# Patient Record
Sex: FEMALE | Race: BLACK OR AFRICAN AMERICAN | Employment: OTHER | URBAN - METROPOLITAN AREA
[De-identification: names, ages, dates, MRNs, and addresses within clinical notes are randomized per-mention and may not be internally consistent; named-entity substitution may affect disease eponyms.]

---

## 2017-06-19 ENCOUNTER — OFFICE VISIT (OUTPATIENT)
Dept: ORTHOPEDIC SURGERY | Age: 72
End: 2017-06-19

## 2017-06-19 ENCOUNTER — HOSPITAL ENCOUNTER (OUTPATIENT)
Age: 72
Discharge: HOME | End: 2017-06-19
Attending: PHYSICAL MEDICINE & REHABILITATION
Payer: MEDICARE

## 2017-06-19 ENCOUNTER — HOSPITAL ENCOUNTER (EMERGENCY)
Age: 72
Discharge: STILL ADMITTED OR ADMINISTRATIVE | End: 2017-06-19
Attending: EMERGENCY MEDICINE
Payer: MEDICARE

## 2017-06-19 VITALS
WEIGHT: 206 LBS | HEIGHT: 67 IN | BODY MASS INDEX: 32.33 KG/M2 | DIASTOLIC BLOOD PRESSURE: 78 MMHG | HEART RATE: 72 BPM | SYSTOLIC BLOOD PRESSURE: 118 MMHG | RESPIRATION RATE: 18 BRPM

## 2017-06-19 DIAGNOSIS — M51.36 OTHER INTERVERTEBRAL DISC DEGENERATION, LUMBAR REGION: ICD-10-CM

## 2017-06-19 DIAGNOSIS — M47.816 SPONDYLOSIS WITHOUT MYELOPATHY OR RADICULOPATHY, LUMBAR REGION: Primary | ICD-10-CM

## 2017-06-19 DIAGNOSIS — M54.16 LUMBAR NEURITIS: ICD-10-CM

## 2017-06-19 DIAGNOSIS — M47.816 SPONDYLOSIS WITHOUT MYELOPATHY OR RADICULOPATHY, LUMBAR REGION: ICD-10-CM

## 2017-06-19 PROCEDURE — 72148 MRI LUMBAR SPINE W/O DYE: CPT

## 2017-06-19 PROCEDURE — 75810000275 HC EMERGENCY DEPT VISIT NO LEVEL OF CARE

## 2017-06-19 RX ORDER — OLMESARTAN MEDOXOMIL, AMLODIPINE AND HYDROCHLOROTHIAZIDE TABLET 40/10/25 MG 40; 10; 25 MG/1; MG/1; MG/1
TABLET ORAL
COMMUNITY
Start: 2017-06-18

## 2017-06-19 RX ORDER — METOPROLOL SUCCINATE 25 MG/1
TABLET, EXTENDED RELEASE ORAL
Refills: 3 | COMMUNITY
Start: 2017-05-21 | End: 2020-05-09

## 2017-06-19 RX ORDER — GABAPENTIN 100 MG/1
CAPSULE ORAL
Qty: 90 CAP | Refills: 1 | Status: SHIPPED | OUTPATIENT
Start: 2017-06-19 | End: 2017-07-26 | Stop reason: SDUPTHER

## 2017-06-19 RX ORDER — ROSUVASTATIN CALCIUM 10 MG/1
TABLET, COATED ORAL
COMMUNITY
Start: 2017-06-18

## 2017-06-19 NOTE — PROGRESS NOTES
[facility]  [address], [address]  Phone: [phone]  Fax: [phone]        INITIAL CONSULTATION      HISTORY OF PRESENT ILLNESS:  Jamal Bledsoe is a 67 y.o. female whom is referred from Dr. Diane Del Real secondary to chronic recurrent low back pain radiating into the LLE x 6 years, progressing x 1 year. No specific injury/trauma. She rates pain 10/10. Pt reports limitations with standing and walking tolerance. Symptoms consistent for stenosis. She denies h/o lumbar spinal surgery. Pt has undergone lumbar blocks in [place] several years ago with good relief. She has not attended physical therapy/chiropractor. Pt has taken Advil with slight benefit. She is not a candidate for Topamax secondary to her h/o glaucoma. The patient has a history of DM and reports blood sugars at 200. Lumbar spine XR dated 5/18/17 reviewed. Per report, scoliosis and multilevel degenerative disc disease. No acute osseous findings. There is mild curvature of the lumbar spine, apex to the right of L3. No pars defect are seen. vertebral body height is maintained. Moderate variable disc space narrowing is seen. No definite spondylolisthesis. The patient is RHD.  reviewed. Past Medical History:   Diagnosis Date    Arthritis     Diabetes ([facility] [address]     Hypertension     Renal lesion     Stroke [facility])           Past Surgical History:   Procedure Laterality Date    ABDOMEN SURGERY PROC UNLISTED      HX CHOLECYSTECTOMY           Social History   Substance Use Topics    Smoking status: Former Smoker    Smokeless tobacco: Never Used    Alcohol use 0.0 oz/week     0 Standard drinks or equivalent per week     Work status: The patient is retired. Marital status: Not available.      Current Outpatient Prescriptions   Medication Sig Dispense Refill    Olmesartan-amLODIPine-HCTZ 40-10-25 mg tab       rosuvastatin (CRESTOR) 10 mg tablet       metoprolol succinate (TOPROL-XL) 25 mg XL tablet TAKE 1 TABLET BY MOUTH ONCE A DAY. 3    gabapentin (NEURONTIN) 100 mg capsule TAKE 1 PO Q TID WITH FOOD  Indications: NEUROPATHIC PAIN 90 Cap 1    aspirin 81 mg chewable tablet Take 81 mg by mouth daily.  bimatoprost (LUMIGAN) 0.03 % ophthalmic drops Administer 1 Drop to both eyes every evening.  lisinopril (PRINIVIL, ZESTRIL) 10 mg tablet Take 10 mg by mouth daily.  amLODIPine (NORVASC) 10 mg tablet Take 10 mg by mouth daily.  pravastatin (PRAVACHOL) 40 mg tablet Take 40 mg by mouth nightly.  pantoprazole (PROTONIX) 40 mg tablet Take 40 mg by mouth daily. Allergies   Allergen Reactions    Mylanta [Aluminum-Magnesium Hydroxide] Hives            Family History   Problem Relation Age of Onset    Hypertension Mother     Hypertension Father     Hypertension Sister     Hypertension Brother          REVIEW OF SYSTEMS  Constitutional symptoms: Negative  Eyes: Negative  Ears, Nose, Throat, and Mouth: Negative  Cardiovascular: Negative  Respiratory: Negative  Genitourinary: Negative  Integumentary (Skin and/or breast): Negative  Musculoskeletal: Positive for low back pain into the LLE. Extremities: Negative for edema. Endocrine/Rheumatologic: Negative  Hematologic/Lymphatic: Negative  Allergic/Immunologic: Negative  Psychiatric: Negative       PHYSICAL EXAMINATION    Visit Vitals    /78    Pulse 72    Resp 18    Ht 5' 7\" (1.702 m)    Wt 206 lb (93.4 kg)    BMI 32.26 kg/m2       CONSTITUTIONAL: NAD, A&O x 3  HEART: Regular rate and rhythm  ABDOMEN: Positive bowel sounds, soft, nontender, and nondistended  LUNGS: Clear to auscultation bilaterally. SKIN: No rashes noted. RANGE OF MOTION: The patient has full passive range of motion in all four extremities. SENSATION: Sensation is intact to light touch throughout.   MOTOR:   Straight Leg Raise: Negative, bilateral  Sahu: Negative, bilateral  Deep tendon reflexes are 1+ at the brachioradialis, biceps, and triceps. Deep tendon reflexes are 0 at the right knee/ankle and 1+ at the left knee/ankle     Shoulder AB/Flex Elbow Flex Wrist Ext Elbow Ext Wrist Flex Hand Intrin Tone   Right +4/5 +4/5 +4/5 +4/5 +4/5 +4/5 +4/5   Left +4/5 +4/5 +4/5 +4/5 +4/5 +4/5 +4/5              Hip Flex Knee Ext Knee Flex Ankle DF GTE Ankle PF Tone   Right +4/5 +4/5 +4/5 +4/5 +4/5 +4/5 +4/5   Left +4/5 +4/5 +4/5 +4/5 +4/5 +4/5 +4/5         ASSESSMENT   Sharad Romero was seen today for back pain and leg pain. Diagnoses and all orders for this visit:    Spondylosis without myelopathy or radiculopathy, lumbar region  -     MRI LUMB SPINE WO CONT; Future  -     gabapentin (NEURONTIN) 100 mg capsule; TAKE 1 PO Q TID WITH FOOD  Indications: NEUROPATHIC PAIN    Other intervertebral disc degeneration, lumbar region  -     MRI LUMB SPINE WO CONT; Future  -     gabapentin (NEURONTIN) 100 mg capsule; TAKE 1 PO Q TID WITH FOOD  Indications: NEUROPATHIC PAIN    Lumbar neuritis  -     MRI LUMB SPINE WO CONT; Future  -     gabapentin (NEURONTIN) 100 mg capsule; TAKE 1 PO Q TID WITH FOOD  Indications: NEUROPATHIC PAIN           IMPRESSIONS/RECOMMENDATIONS:  She describes symptoms consistent for spinal stenosis. I will set her up for a lumbar spine MRI. I advised patient to bring copies of films to next visit. In the interim, I will try her on Neurontin 100 mg TID. The risks, benefits, and potential side effects of this medication were discussed. Patient understands and wishes to proceed. Patient advised to call the office if intolerant to new medication. Oral steroids deferred due to her elevated blood sugars. I will see the patient back following MRI. Written by Lisbeth Morley, as dictated by Allen Kelly MD  I examined the patient, reviewed and agree with the note.

## 2017-06-19 NOTE — MR AVS SNAPSHOT
Visit Information Date & Time Provider Department Dept. Phone Encounter #  
 6/19/2017 10:00 AM Varsha Suggs MD [facility] [place] [phone] [id number] Follow-up Instructions Return for following MRI. Upcoming Health Maintenance Date Due Hepatitis C Screening 1945 DTaP/Tdap/Td series (1 - Tdap) 1/13/1966 BREAST CANCER SCRN MAMMOGRAM 1/13/1995 FOBT Q 1 YEAR AGE 50-75 1/13/1995 ZOSTER VACCINE AGE 60> 1/13/2005 GLAUCOMA SCREENING Q2Y 1/13/2010 OSTEOPOROSIS SCREENING (DEXA) 1/13/2010 Pneumococcal 65+ Low/Medium Risk (1 of 2 - PCV13) 1/13/2010 MEDICARE YEARLY EXAM 1/13/2010 INFLUENZA AGE 9 TO ADULT 8/1/2017 Allergies as of 6/19/2017  Review Complete On: 6/19/2017 By: Varsha Suggs MD  
  
 Severity Noted Reaction Type Reactions Mylanta [Aluminum-magnesium Hydroxide]  08/11/2015    Hives Current Immunizations  Never Reviewed No immunizations on file. Not reviewed this visit You Were Diagnosed With   
  
 Codes Comments Spondylosis without myelopathy or radiculopathy, lumbar region    -  Primary ICD-10-CM: M47.816 ICD-9-CM: 721.3 Other intervertebral disc degeneration, lumbar region     ICD-10-CM: M51.36 
ICD-9-CM: 722.52 Lumbar neuritis     ICD-10-CM: M54.16 
ICD-9-CM: 724.4 Vitals BP Pulse Resp Height(growth percentile) Weight(growth percentile) BMI  
 118/78 72 18 5' 7\" (1.702 m) 206 lb (93.4 kg) 32.26 kg/m2 OB Status Smoking Status Postmenopausal Former Smoker Vitals History BMI and BSA Data Body Mass Index Body Surface Area  
 32.26 kg/m 2 2.1 m 2 Preferred Pharmacy Pharmacy Name Phone [id number] [address] [phone] Your Updated Medication List  
  
   
This list is accurate as of: 6/19/17 11:01 AM.  Always use your most recent med list.  
  
  
  
  
 amLODIPine 10 mg tablet Commonly known as:  Uriah Tyesha Take 10 mg by mouth daily. aspirin 81 mg chewable tablet Take 81 mg by mouth daily. gabapentin 100 mg capsule Commonly known as:  NEURONTIN  
TAKE 1 PO Q TID WITH FOOD  Indications: NEUROPATHIC PAIN  
  
 lisinopril 10 mg tablet Commonly known as:  Jc Dance Take 10 mg by mouth daily. LUMIGAN 0.03 % ophthalmic drops Generic drug:  bimatoprost  
Administer 1 Drop to both eyes every evening. metoprolol succinate 25 mg XL tablet Commonly known as:  TOPROL-XL  
TAKE 1 TABLET BY MOUTH ONCE A DAY. Olmesartan-amLODIPine-HCTZ 40-10-25 mg Tab  
  
 pantoprazole 40 mg tablet Commonly known as:  PROTONIX Take 40 mg by mouth daily. pravastatin 40 mg tablet Commonly known as:  PRAVACHOL Take 40 mg by mouth nightly. rosuvastatin 10 mg tablet Commonly known as:  CRESTOR Prescriptions Sent to Pharmacy Refills  
 gabapentin (NEURONTIN) 100 mg capsule 1 Sig: TAKE 1 PO Q TID WITH FOOD  Indications: NEUROPATHIC PAIN Class: Normal  
 Pharmacy: [address] [place] [address] #: [phone] Follow-up Instructions Return for following MRI. To-Do List   
 06/19/2017 Imaging:  MRI LUMB SPINE WO CONT Introducing [facility] & HEALTH SERVICES! [facility] introduces Infer patient portal. Now you can access parts of your medical record, email your doctor's office, and request medication refills online. 1. In your internet browser, go to [email or web address] 2. Click on the First Time User? Click Here link in the Sign In box. You will see the New Member Sign Up page. 3. Enter your Infer Access Code exactly as it appears below. You will not need to use this code after youve completed the sign-up process. If you do not sign up before the expiration date, you must request a new code. · [facility] Access Code: [email or web address] Expires: 9/17/2017  9:40 AM 
 
4. Enter the last four digits of your Social Security Number (xxxx) and Date of Birth (mm/dd/yyyy) as indicated and click Submit. You will be taken to the next sign-up page. 5. Create a [facility] ID. This will be your [facility] login ID and cannot be changed, so think of one that is secure and easy to remember. 6. Create a [facility] password. You can change your password at any time. 7. Enter your Password Reset Question and Answer. This can be used at a later time if you forget your password. 8. Enter your e-mail address. You will receive e-mail notification when new information is available in [address] 9. Click Sign Up. You can now view and download portions of your medical record. 10. Click the Download Summary menu link to download a portable copy of your medical information. If you have questions, please visit the Frequently Asked Questions section of the [facility] website. Remember, [facility] is NOT to be used for urgent needs. For medical emergencies, dial 911. Now available from your iPhone and Android! Please provide this summary of care documentation to your next provider. Your primary care clinician is listed as Marissa Del Real. If you have any questions after today's visit, please call [phone].

## 2017-07-26 ENCOUNTER — OFFICE VISIT (OUTPATIENT)
Dept: ORTHOPEDIC SURGERY | Age: 72
End: 2017-07-26

## 2017-07-26 VITALS
SYSTOLIC BLOOD PRESSURE: 165 MMHG | HEART RATE: 68 BPM | WEIGHT: 207 LBS | BODY MASS INDEX: 32.49 KG/M2 | DIASTOLIC BLOOD PRESSURE: 74 MMHG | HEIGHT: 67 IN

## 2017-07-26 DIAGNOSIS — N28.89 RENAL MASS, LEFT: Primary | ICD-10-CM

## 2017-07-26 DIAGNOSIS — M51.36 OTHER INTERVERTEBRAL DISC DEGENERATION, LUMBAR REGION: ICD-10-CM

## 2017-07-26 DIAGNOSIS — M54.16 LUMBAR RADICULOPATHY: ICD-10-CM

## 2017-07-26 DIAGNOSIS — G95.9 SPINAL CORD LESION (HCC): ICD-10-CM

## 2017-07-26 DIAGNOSIS — M47.816 SPONDYLOSIS WITHOUT MYELOPATHY OR RADICULOPATHY, LUMBAR REGION: ICD-10-CM

## 2017-07-26 DIAGNOSIS — R93.89 ABNORMAL MRI: ICD-10-CM

## 2017-07-26 DIAGNOSIS — M54.16 LUMBAR NEURITIS: ICD-10-CM

## 2017-07-26 RX ORDER — GABAPENTIN 100 MG/1
100 CAPSULE ORAL 3 TIMES DAILY
Qty: 270 CAP | Refills: 0 | Status: SHIPPED | OUTPATIENT
Start: 2017-07-26 | End: 2017-12-11 | Stop reason: SDUPTHER

## 2017-07-26 NOTE — MR AVS SNAPSHOT
Visit Information Date & Time Provider Department Dept. Phone Encounter #  
 7/26/2017  3:20 PM Char Barclay MD [facility] [address] [facility] - SPECIALTY [facility] [phone] [id number] Follow-up Instructions Return for following MRI and US. Upcoming Health Maintenance Date Due Hepatitis C Screening 1945 DTaP/Tdap/Td series (1 - Tdap) 1/13/1966 BREAST CANCER SCRN MAMMOGRAM 1/13/1995 FOBT Q 1 YEAR AGE 50-75 1/13/1995 ZOSTER VACCINE AGE 60> 11/13/2004 GLAUCOMA SCREENING Q2Y 1/13/2010 OSTEOPOROSIS SCREENING (DEXA) 1/13/2010 Pneumococcal 65+ Low/Medium Risk (1 of 2 - PCV13) 1/13/2010 MEDICARE YEARLY EXAM 1/13/2010 INFLUENZA AGE 9 TO ADULT 8/1/2017 Allergies as of 7/26/2017  Review Complete On: 7/26/2017 By: Char Barclay MD  
  
 Severity Noted Reaction Type Reactions Mylanta [Aluminum-magnesium Hydroxide]  08/11/2015    Hives Current Immunizations  Never Reviewed No immunizations on file. Not reviewed this visit You Were Diagnosed With   
  
 Codes Comments Abnormal MRI    -  Primary ICD-10-CM: R93.8 ICD-9-CM: 793.99 Spondylosis without myelopathy or radiculopathy, lumbar region     ICD-10-CM: M47.816 ICD-9-CM: 721.3 Other intervertebral disc degeneration, lumbar region     ICD-10-CM: M51.36 
ICD-9-CM: 722.52 Lumbar radiculopathy     ICD-10-CM: M54.16 
ICD-9-CM: 724.4 Spinal cord lesion (HCC)     ICD-10-CM: G95.9 ICD-9-CM: 336.9 Vitals BP Pulse Height(growth percentile) Weight(growth percentile) BMI OB Status 165/74 68 5' 7\" (1.702 m) 207 lb (93.9 kg) 32.42 kg/m2 Postmenopausal  
 Smoking Status Former Smoker Vitals History BMI and BSA Data Body Mass Index Body Surface Area  
 32.42 kg/m 2 2.11 m 2 Preferred Pharmacy Pharmacy Name Phone [id number] [phone] Your Updated Medication List  
  
   
This list is accurate as of: 7/26/17  4:08 PM.  Always use your most recent med list. amLODIPine 10 mg tablet Commonly known as:  Ramsey Inks Take 10 mg by mouth daily. aspirin 81 mg chewable tablet Take 81 mg by mouth daily. gabapentin 100 mg capsule Commonly known as:  NEURONTIN  
TAKE 1 PO Q TID WITH FOOD  Indications: NEUROPATHIC PAIN  
  
 lisinopril 10 mg tablet Commonly known as:  Tamara Drought Take 10 mg by mouth daily. LUMIGAN 0.03 % ophthalmic drops Generic drug:  bimatoprost  
Administer 1 Drop to both eyes every evening. metoprolol succinate 25 mg XL tablet Commonly known as:  TOPROL-XL  
TAKE 1 TABLET BY MOUTH ONCE A DAY. Olmesartan-amLODIPine-HCTZ 40-10-25 mg Tab  
  
 pantoprazole 40 mg tablet Commonly known as:  PROTONIX Take 40 mg by mouth daily. pravastatin 40 mg tablet Commonly known as:  PRAVACHOL Take 40 mg by mouth nightly. rosuvastatin 10 mg tablet Commonly known as:  CRESTOR Follow-up Instructions Return for following MRI and US. To-Do List   
 08/02/2017 Imaging:  MRI [facility] SPINE W WO CONT   
  
 08/02/2017 Imaging:  US KIDNEY LT Introducing [facility] & HEALTH SERVICES! [facility] introduces ClarityRay patient portal. Now you can access parts of your medical record, email your doctor's office, and request medication refills online. 1. In your internet browser, go to [email or web address] 2. Click on the First Time User? Click Here link in the Sign In box. You will see the New Member Sign Up page. 3. Enter your [employer] Access Code exactly as it appears below. You will not need to use this code after youve completed the sign-up process. If you do not sign up before the expiration date, you must request a new code. · [employer] Access Code: [email or web address] Expires: 9/17/2017  9:40 AM 
 
 4. Enter the last four digits of your Social Security Number (xxxx) and Date of Birth (mm/dd/yyyy) as indicated and click Submit. You will be taken to the next sign-up page. 5. Create a pushd ID. This will be your pushd login ID and cannot be changed, so think of one that is secure and easy to remember. 6. Create a pushd password. You can change your password at any time. 7. Enter your Password Reset Question and Answer. This can be used at a later time if you forget your password. 8. Enter your e-mail address. You will receive e-mail notification when new information is available in [address] 9. Click Sign Up. You can now view and download portions of your medical record. 10. Click the Download Summary menu link to download a portable copy of your medical information. If you have questions, please visit the Frequently Asked Questions section of the pushd website. Remember, pushd is NOT to be used for urgent needs. For medical emergencies, dial 911. Now available from your iPhone and Android! Please provide this summary of care documentation to your next provider. Your primary care clinician is listed as Marissa Del Real. If you have any questions after today's visit, please call [phone].

## 2017-07-26 NOTE — PROGRESS NOTES
[facility]  [address], [address]  Phone: [phone]  Fax: [phone]        PROGRESS NOTE      HISTORY OF PRESENT ILLNESS:  The patient is a 67 y.o. female and was seen today for follow up of chronic recurrent low back pain radiating into the LLE x 6 years, progressing x 1 year. No specific injury/trauma. Pt reports limitations with standing and walking tolerance. Symptoms consistent for stenosis. She denies h/o lumbar spinal surgery. Pt has undergone lumbar blocks in [place] several years ago with good relief. She has not attended physical therapy/chiropractor. Pt has taken Advil with slight benefit. She is not a candidate for Topamax secondary to her h/o glaucoma. The patient has a history of DM and reports blood sugars at 200. Lumbar spine XR dated 5/18/17 reviewed. Per report, scoliosis and multilevel degenerative disc disease. No acute osseous findings. There is mild curvature of the lumbar spine, apex to the right of L3. No pars defect are seen. vertebral body height is maintained. Moderate variable disc space narrowing is seen. No definite spondylolisthesis. The patient is RHD. At his last clinical appointment, she described symptoms consistent for spinal stenosis. I set her up for a lumbar spine MRI. I tried her on Neurontin 100 mg TID. Oral steroids deferred due to her elevated blood sugars. The patient returns today with pain location and distribution remain unchanged. She rates pain 0/10, an improvement since her last visit (10/10). Pt is tolerating Neurontin 100 mg TID with good relief. Lumbar spine MRI dated 6/19/17 reviewed. Per report, multilevel mild to moderate degenerative disc and facet joint disease throughout the lumbar spine. No high-grade central canal stenosis. Moderate foraminal stenosis at right L5/S1 due to disc osteophyte. Stable posterior paraspinal lipoma with central 5 mm calcification.  Questionable 7 mm intraspinal lesion versus volume averaging seen only on sagittal images in the lower thoracic spine T10/T11 recommend dedicated MRI with and without contrast of this region with thin section axial images at this level. Metallic susceptibility artifact in the abdomen, likely from retained ingested pill cam as seen on the CT 2015. 5 mm left renal upper pole lesion, can be evaluated with ultrasound and likely cyst.  reviewed. Past Medical History:   Diagnosis Date    Arthritis     Diabetes ([address]     Hypertension     Renal lesion     Stroke [facility])         Social History     Social History    Marital status:      Spouse name: N/A    Number of children: N/A    Years of education: N/A     Occupational History    Not on file. Social History Main Topics    Smoking status: Former Smoker    Smokeless tobacco: Never Used    Alcohol use 0.0 oz/week     0 Standard drinks or equivalent per week    Drug use: No    Sexual activity: Not on file     Other Topics Concern    Not on file     Social History Narrative       Current Outpatient Prescriptions   Medication Sig Dispense Refill    gabapentin (NEURONTIN) 100 mg capsule Take 1 Cap by mouth three (3) times daily. Indications: NEUROPATHIC PAIN 270 Cap 0    Olmesartan-amLODIPine-HCTZ 40-10-25 mg tab       rosuvastatin (CRESTOR) 10 mg tablet       metoprolol succinate (TOPROL-XL) 25 mg XL tablet TAKE 1 TABLET BY MOUTH ONCE A DAY. 3    bimatoprost (LUMIGAN) 0.03 % ophthalmic drops Administer 1 Drop to both eyes every evening.  lisinopril (PRINIVIL, ZESTRIL) 10 mg tablet Take 10 mg by mouth daily.  aspirin 81 mg chewable tablet Take 81 mg by mouth daily.  amLODIPine (NORVASC) 10 mg tablet Take 10 mg by mouth daily.  pravastatin (PRAVACHOL) 40 mg tablet Take 40 mg by mouth nightly.  pantoprazole (PROTONIX) 40 mg tablet Take 40 mg by mouth daily.          Allergies   Allergen Reactions    Mylanta [Aluminum-Magnesium Hydroxide] Hives PHYSICAL EXAMINATION    Visit Vitals    /74    Pulse 68    Ht 5' 7\" (1.702 m)    Wt 207 lb (93.9 kg)    BMI 32.42 kg/m2       CONSTITUTIONAL: NAD, A&O x 3  SENSATION: Intact to light touch throughout  RANGE OF MOTION: The patient has full passive range of motion in all four extremities. MOTOR:  Straight Leg Raise: Negative, bilateral               Hip Flex Knee Ext Knee Flex Ankle DF GTE Ankle PF Tone   Right +4/5 +4/5 +4/5 +4/5 +4/5 +4/5 +4/5   Left +4/5 +4/5 +4/5 +4/5 +4/5 +4/5 +4/5       ASSESSMENT   Diagnoses and all orders for this visit:    1. Lesion of left native kidney  -     MRI [facility] SPINE W WO CONT; Future  -     US KIDNEY LT; Future  -     gabapentin (NEURONTIN) 100 mg capsule; Take 1 Cap by mouth three (3) times daily. Indications: NEUROPATHIC PAIN  -     US KIDNEY LT; Future  -     US KIDNEY LT; Future    2. Spondylosis without myelopathy or radiculopathy, lumbar region  -     MRI [facility] SPINE W WO CONT; Future  -     US KIDNEY LT; Future  -     gabapentin (NEURONTIN) 100 mg capsule; Take 1 Cap by mouth three (3) times daily. Indications: NEUROPATHIC PAIN  -     US KIDNEY LT; Future  -     US KIDNEY LT; Future    3. Other intervertebral disc degeneration, lumbar region  -     MRI [facility] SPINE W WO CONT; Future  -     US KIDNEY LT; Future  -     gabapentin (NEURONTIN) 100 mg capsule; Take 1 Cap by mouth three (3) times daily. Indications: NEUROPATHIC PAIN  -     US KIDNEY LT; Future  -     US KIDNEY LT; Future    4. Lumbar radiculopathy  -     MRI [facility] SPINE W WO CONT; Future  -     US KIDNEY LT; Future  -     gabapentin (NEURONTIN) 100 mg capsule; Take 1 Cap by mouth three (3) times daily. Indications: NEUROPATHIC PAIN  -     US KIDNEY LT; Future  -     US KIDNEY LT; Future    5. T-spine spinal lesion (HCC)  -     MRI [facility] SPINE W WO CONT; Future  -     US KIDNEY LT; Future  -     gabapentin (NEURONTIN) 100 mg capsule; Take 1 Cap by mouth three (3) times daily.  Indications: NEUROPATHIC PAIN  -     US KIDNEY LT; Future  -     US KIDNEY LT; Future    6. Abnormal MRI  -     MRI [facility] SPINE W WO CONT; Future  -     US KIDNEY LT; Future  -     gabapentin (NEURONTIN) 100 mg capsule; Take 1 Cap by mouth three (3) times daily. Indications: NEUROPATHIC PAIN  -     US KIDNEY LT; Future    7. Lumbar neuritis  -     MRI [facility] SPINE W WO CONT; Future  -     US KIDNEY LT; Future  -     gabapentin (NEURONTIN) 100 mg capsule; Take 1 Cap by mouth three (3) times daily. Indications: NEUROPATHIC PAIN  -     US KIDNEY LT; Future  -     US KIDNEY LT; Future          IMPRESSION AND PLAN:  Patient symptoms appears to have improved since starting Neurontin 300 mg TID. As recommended by radiologist, I will order thoracic spine MRI to further evaluate for questionable T10/[id number] lesion along with a US to further evaluate left renal upper pole lesion. I will see the patient back following thoracic spine MRI and US. Written by Zion Ray, as dictated by Ariana Sanz MD  I examined the patient, reviewed and agree with the note.

## 2017-08-07 ENCOUNTER — HOSPITAL ENCOUNTER (OUTPATIENT)
Dept: ULTRASOUND IMAGING | Age: 72
Discharge: HOME | End: 2017-08-07
Attending: PHYSICAL MEDICINE & REHABILITATION
Payer: MEDICARE

## 2017-08-07 ENCOUNTER — HOSPITAL ENCOUNTER (OUTPATIENT)
Age: 72
Discharge: HOME | End: 2017-08-07
Attending: PHYSICAL MEDICINE & REHABILITATION
Payer: MEDICARE

## 2017-08-07 DIAGNOSIS — G95.9 SPINAL CORD LESION (HCC): ICD-10-CM

## 2017-08-07 DIAGNOSIS — R93.89 ABNORMAL MRI: ICD-10-CM

## 2017-08-07 DIAGNOSIS — M47.816 SPONDYLOSIS WITHOUT MYELOPATHY OR RADICULOPATHY, LUMBAR REGION: ICD-10-CM

## 2017-08-07 DIAGNOSIS — M54.16 LUMBAR NEURITIS: ICD-10-CM

## 2017-08-07 DIAGNOSIS — M54.16 LUMBAR RADICULOPATHY: ICD-10-CM

## 2017-08-07 DIAGNOSIS — N28.89 RENAL MASS, LEFT: ICD-10-CM

## 2017-08-07 DIAGNOSIS — M51.36 OTHER INTERVERTEBRAL DISC DEGENERATION, LUMBAR REGION: ICD-10-CM

## 2017-08-07 PROCEDURE — 76770 US EXAM ABDO BACK WALL COMP: CPT

## 2017-08-07 PROCEDURE — 74011250636 HC RX REV CODE- 250/636: Performed by: PHYSICAL MEDICINE & REHABILITATION

## 2017-08-07 PROCEDURE — A9585 GADOBUTROL INJECTION: HCPCS | Performed by: PHYSICAL MEDICINE & REHABILITATION

## 2017-08-07 PROCEDURE — 72157 MRI CHEST SPINE W/O & W/DYE: CPT

## 2017-08-07 RX ADMIN — GADOBUTROL 9 ML: 604.72 INJECTION INTRAVENOUS at 13:00

## 2017-08-08 ENCOUNTER — TELEPHONE (OUTPATIENT)
Dept: ORTHOPEDIC SURGERY | Age: 72
End: 2017-08-08

## 2017-08-08 NOTE — TELEPHONE ENCOUNTER
Patient is aware of the increase and how to properly increase. I advised her to call if she has any problems. Patient verbalized understanding.

## 2017-08-08 NOTE — TELEPHONE ENCOUNTER
In his last note, she was at 300 mg TID. If she is only taking 100 TID, she can titrate up to 300 mg TID.

## 2017-08-08 NOTE — TELEPHONE ENCOUNTER
Patient states that it was helping and now she is having increased pain. Pain is in the center of her back she denies injury. Please advise.

## 2017-08-08 NOTE — TELEPHONE ENCOUNTER
Patient called to request an increased dosage of gabapentin (NEURONTIN) 100 mg   Patient states she is still in pain.   Please advise patient at [phone]

## 2017-08-10 ENCOUNTER — TELEPHONE (OUTPATIENT)
Dept: ORTHOPEDIC SURGERY | Age: 72
End: 2017-08-10

## 2017-08-10 NOTE — TELEPHONE ENCOUNTER
Patient is aware that she can use her 100mg capsules to equal her 300mg. Patient verbalized understanding.

## 2017-08-10 NOTE — TELEPHONE ENCOUNTER
Patient calling checking the status of the med refill for Gabapentin. I see where Mirna Garcia spoke with her about the dosage change but not where it was called in. Patient says pharmacy does not have it. Please advise. Patient can be reached at once rx is called in at [phone].

## 2017-09-06 NOTE — TELEPHONE ENCOUNTER
It appears a prescription for a 90 day supply was provided to the patient during the [address]. Called [facility] and spoke with pharmacist who states the patient filled a 90 day supply of Gabapentin on 07/26, which should be enough medication to get her through October 24th. Called patient and left a voicemail on an unidentified voicemail requesting a call back and have the patient check for another bottle and advise how she is taking the medication. Awaiting patient's call.

## 2017-09-06 NOTE — TELEPHONE ENCOUNTER
Patient call states that she has ran out of her Gabapentin (NEURONTIN) 100 mg capsule as of today 09/06/17. Patient can be reached at [phone].

## 2017-09-07 RX ORDER — GABAPENTIN 300 MG/1
300 CAPSULE ORAL 3 TIMES DAILY
Qty: 90 CAP | Refills: 0 | Status: SHIPPED | OUTPATIENT
Start: 2017-09-07 | End: 2017-12-11 | Stop reason: SDUPTHER

## 2017-09-07 NOTE — TELEPHONE ENCOUNTER
Pt called: could not reach brady ricardo. Informed pt to contact her [facility] pharmacy to request her refills.

## 2017-09-07 NOTE — TELEPHONE ENCOUNTER
Patient reports she has been taking 3 of the 100 mg capsules 3 times a day as instructed by Dr. Yaritza Escobedo (300 mg TID). It appears the prescription provided to the patient during the LOV was for 100 mg caps to take TID. The patient is now out of medication. Pending order to reflect directions in last office note. IMPRESSION AND PLAN:  Patient symptoms appears to have improved since starting Neurontin 300 mg TID    Requested Prescriptions     Pending Prescriptions Disp Refills    gabapentin (NEURONTIN) 300 mg capsule 90 Cap 0     Sig: Take 1 Cap by mouth three (3) times daily.

## 2017-09-11 ENCOUNTER — OFFICE VISIT (OUTPATIENT)
Dept: ORTHOPEDIC SURGERY | Age: 72
End: 2017-09-11

## 2017-09-11 ENCOUNTER — TELEPHONE (OUTPATIENT)
Dept: ORTHOPEDIC SURGERY | Age: 72
End: 2017-09-11

## 2017-09-11 VITALS
HEART RATE: 50 BPM | WEIGHT: 212 LBS | BODY MASS INDEX: 33.27 KG/M2 | SYSTOLIC BLOOD PRESSURE: 150 MMHG | RESPIRATION RATE: 16 BRPM | HEIGHT: 67 IN | DIASTOLIC BLOOD PRESSURE: 68 MMHG

## 2017-09-11 DIAGNOSIS — M54.16 LUMBAR RADICULOPATHY: ICD-10-CM

## 2017-09-11 DIAGNOSIS — M47.816 SPONDYLOSIS WITHOUT MYELOPATHY OR RADICULOPATHY, LUMBAR REGION: Primary | ICD-10-CM

## 2017-09-11 DIAGNOSIS — M51.36 OTHER INTERVERTEBRAL DISC DEGENERATION, LUMBAR REGION: ICD-10-CM

## 2017-09-11 RX ORDER — GABAPENTIN 300 MG/1
300 CAPSULE ORAL 3 TIMES DAILY
Qty: 270 CAP | Refills: 0 | Status: SHIPPED | OUTPATIENT
Start: 2017-09-11 | End: 2017-12-11 | Stop reason: SDUPTHER

## 2017-09-11 RX ORDER — HYOSCYAMINE SULFATE 0.12 MG/1
0.12 TABLET SUBLINGUAL
COMMUNITY
Start: 2017-06-20

## 2017-09-11 RX ORDER — CYCLOSPORINE 0.5 MG/ML
EMULSION OPHTHALMIC
COMMUNITY
Start: 2017-07-03

## 2017-09-11 NOTE — MR AVS SNAPSHOT
Visit Information Date & Time Provider Department Dept. Phone Encounter #  
 9/11/2017  9:15 AM Eula Castellon MD [facility], [address] [facility] - SPECIALTY [facility] [phone] [id number] Follow-up Instructions Return for f/u with Nurse Practitioner in 3 month's. Upcoming Health Maintenance Date Due Hepatitis C Screening 1945 DTaP/Tdap/Td series (1 - Tdap) 1/13/1966 BREAST CANCER SCRN MAMMOGRAM 1/13/1995 FOBT Q 1 YEAR AGE 50-75 1/13/1995 ZOSTER VACCINE AGE 60> 11/13/2004 GLAUCOMA SCREENING Q2Y 1/13/2010 OSTEOPOROSIS SCREENING (DEXA) 1/13/2010 Pneumococcal 65+ Low/Medium Risk (1 of 2 - PCV13) 1/13/2010 MEDICARE YEARLY EXAM 1/13/2010 INFLUENZA AGE 9 TO ADULT 8/1/2017 Allergies as of 9/11/2017  Review Complete On: 9/11/2017 By: Eula Castellon MD  
  
 Severity Noted Reaction Type Reactions Mylanta [Aluminum-magnesium Hydroxide]  08/11/2015    Hives Current Immunizations  Never Reviewed No immunizations on file. Not reviewed this visit You Were Diagnosed With   
  
 Codes Comments Spondylosis without myelopathy or radiculopathy, lumbar region    -  Primary ICD-10-CM: M47.816 ICD-9-CM: 721.3 Lumbar radiculopathy     ICD-10-CM: M54.16 
ICD-9-CM: 724.4 Other intervertebral disc degeneration, lumbar region     ICD-10-CM: M51.36 
ICD-9-CM: 722.52 Vitals BP Pulse Resp Height(growth percentile) Weight(growth percentile) BMI  
 150/68 (!) 50 16 5' 7\" (1.702 m) 212 lb (96.2 kg) 33.2 kg/m2 OB Status Smoking Status Postmenopausal Former Smoker BMI and BSA Data Body Mass Index Body Surface Area  
 33.2 kg/m 2 2.13 m 2 Preferred Pharmacy Pharmacy Name Phone [id number] [address] [phone] Your Updated Medication List  
  
   
This list is accurate as of: 9/11/17 10:23 AM.  Always use your most recent med list.  
 amLODIPine 10 mg tablet Commonly known as:  Unknown [place] Take 10 mg by mouth daily. aspirin 81 mg chewable tablet Take 81 mg by mouth daily. * gabapentin 100 mg capsule Commonly known as:  NEURONTIN Take 1 Cap by mouth three (3) times daily. Indications: NEUROPATHIC PAIN  
  
 * gabapentin 300 mg capsule Commonly known as:  NEURONTIN Take 1 Cap by mouth three (3) times daily. * gabapentin 300 mg capsule Commonly known as:  NEURONTIN Take 1 Cap by mouth three (3) times daily. hyoscyamine SL 0.125 mg SL tablet Commonly known as:  LEVSIN/SL  
0.125 mg.  
  
 lisinopril 10 mg tablet Commonly known as:  [place] Take 10 mg by mouth daily. LUMIGAN 0.03 % ophthalmic drops Generic drug:  bimatoprost  
Administer 1 Drop to both eyes every evening. metoprolol succinate 25 mg XL tablet Commonly known as:  TOPROL-XL  
TAKE 1 TABLET BY MOUTH ONCE A DAY. Olmesartan-amLODIPine-HCTZ 40-10-25 mg Tab  
  
 pantoprazole 40 mg tablet Commonly known as:  PROTONIX Take 40 mg by mouth daily. pravastatin 40 mg tablet Commonly known as:  PRAVACHOL Take 40 mg by mouth nightly. RESTASIS 0.05 % ophthalmic emulsion Generic drug:  cycloSPORINE  
  
 rosuvastatin 10 mg tablet Commonly known as:  CRESTOR * Notice: This list has 3 medication(s) that are the same as other medications prescribed for you. Read the directions carefully, and ask your doctor or other care provider to review them with you. Prescriptions Sent to Pharmacy Refills  
 gabapentin (NEURONTIN) 300 mg capsule 0 Sig: Take 1 Cap by mouth three (3) times daily. Class: Normal  
 Pharmacy: [address] [place] [address] #: [phone] Route: Oral  
  
Follow-up Instructions Return for f/u with Nurse Practitioner in 3 month's. Introducing [facility] SERVICES! Delphine Bean introduces [employer] patient portal. Now you can access parts of your medical record, email your doctor's office, and request medication refills online. 1. In your internet browser, go to [email or web address] 2. Click on the First Time User? Click Here link in the Sign In box. You will see the New Member Sign Up page. 3. Enter your [employer] Access Code exactly as it appears below. You will not need to use this code after youve completed the sign-up process. If you do not sign up before the expiration date, you must request a new code. · [employer] Access Code: [email or web address] Expires: 9/17/2017  9:40 AM 
 
4. Enter the last four digits of your Social Security Number (xxxx) and Date of Birth (mm/dd/yyyy) as indicated and click Submit. You will be taken to the next sign-up page. 5. Create a [employer] ID. This will be your [employer] login ID and cannot be changed, so think of one that is secure and easy to remember. 6. Create a [employer] password. You can change your password at any time. 7. Enter your Password Reset Question and Answer. This can be used at a later time if you forget your password. 8. Enter your e-mail address. You will receive e-mail notification when new information is available in [address] 9. Click Sign Up. You can now view and download portions of your medical record. 10. Click the Download Summary menu link to download a portable copy of your medical information. If you have questions, please visit the Frequently Asked Questions section of the [employer] website. Remember, [employer] is NOT to be used for urgent needs. For medical emergencies, dial 911. Now available from your iPhone and Android! Please provide this summary of care documentation to your next provider. Your primary care clinician is listed as Marissa Del Real. If you have any questions after today's visit, please call [phone].

## 2017-09-11 NOTE — PROGRESS NOTES
[facility]  [address], [address]  Phone: [phone]  Fax: [phone]        PROGRESS NOTE      HISTORY OF PRESENT ILLNESS:  The patient is a 67 y.o. female and was seen today for follow up of chronic recurrent low back pain radiating into the LLE x 6 years, progressing x 1 year. No specific injury/trauma. Pt reports limitations with standing and walking tolerance. Symptoms consistent for stenosis. She denies h/o lumbar spinal surgery. Pt has undergone lumbar blocks in [place] several years ago with good relief. She has not attended physical therapy/chiropractor. Pt has taken Advil with slight benefit. She is not a candidate for Topamax secondary to her h/o glaucoma. The patient has a history of DM and reports blood sugars at 200. Lumbar spine XR dated 5/18/17 reviewed. Per report, scoliosis and multilevel degenerative disc disease. No acute osseous findings. There is mild curvature of the lumbar spine, apex to the right of L3. No pars defect are seen. vertebral body height is maintained. Moderate variable disc space narrowing is seen. No definite spondylolisthesis. Lumbar spine MRI dated 6/19/17 reviewed. Per report, multilevel mild to moderate degenerative disc and facet joint disease throughout the lumbar spine. No high-grade central canal stenosis. Moderate foraminal stenosis at right L5/S1 due to disc osteophyte. Stable posterior paraspinal lipoma with central 5 mm calcification. Questionable 7 mm intraspinal lesion versus volume averaging seen only on sagittal images in the lower thoracic spine T10/T11 recommend dedicated MRI with and without contrast of this region with thin section axial images at this level. Metallic susceptibility artifact in the abdomen, likely from retained ingested pill cam as seen on the CT 2015. 5 mm left renal upper pole lesion, can be evaluated with ultrasound and likely cyst. The patient is RHD.  At his last clinical appointment, patient symptoms appeared to have improved since starting Neurontin 300 mg TID. As recommended by radiologist, I ordered thoracic spine MRI to further evaluate for questionable T10/[id number] lesion along with a US to further evaluate left renal upper pole lesion. The patient returns today with pain location and distribution remain unchanged. She continues to rate pain 0/10. Pt reports the office hypertensive today (150/68) and has been having headaches x 3 weeks. My nurse did call her PCP's office to notify them. She is tolerating Neurontin 300 mg TID with good relief. She notes she ran out of Neurontin x 3 days with a significant increase in pain. Thoracic spine MRI dated 8/7/17 reviewed. Per report, Corresponding with that identified on lumbar spine MRI, there is local degenerative change at T10-11 including facet arthropathy which contributes to  contour deformity of the cord. The appearance on the prior lumbar spine MRI primarily corresponds with those findings. There is a small area of vascularity along thecal sac and possibly within the left side of the cord that could reflect a small telangiectasia. This could reflect a small area of vascular blush secondary to the local facet arthropathy. There is no concerning mass lesion. Patent canal and foramina. US retroperitoneum dated 8/7/17 per report revealed small lesion at the upper pole of the left kidney on the MRI is not well seen sonographically. May be limited by its small size and presence of bowel gas. This remains completely assessed (?).  reviewed. Past Medical History:   Diagnosis Date    Arthritis     Diabetes ([facility] [address]     Hypertension     Renal lesion     Stroke [facility])         Social History     Social History    Marital status:      Spouse name: N/A    Number of children: N/A    Years of education: N/A     Occupational History    Not on file.      Social History Main Topics    Smoking status: Former Smoker  Smokeless tobacco: Never Used    Alcohol use 0.0 oz/week     0 Standard drinks or equivalent per week    Drug use: No    Sexual activity: Not on file     Other Topics Concern    Not on file     Social History Narrative       Current Outpatient Prescriptions   Medication Sig Dispense Refill    hyoscyamine SL (LEVSIN/SL) 0.125 mg SL tablet 0.125 mg.      cycloSPORINE (RESTASIS) 0.05 % ophthalmic emulsion       gabapentin (NEURONTIN) 300 mg capsule Take 1 Cap by mouth three (3) times daily. 270 Cap 0    gabapentin (NEURONTIN) 300 mg capsule Take 1 Cap by mouth three (3) times daily. 90 Cap 0    Olmesartan-amLODIPine-HCTZ 40-10-25 mg tab       rosuvastatin (CRESTOR) 10 mg tablet       metoprolol succinate (TOPROL-XL) 25 mg XL tablet TAKE 1 TABLET BY MOUTH ONCE A DAY. 3    aspirin 81 mg chewable tablet Take 81 mg by mouth daily.  bimatoprost (LUMIGAN) 0.03 % ophthalmic drops Administer 1 Drop to both eyes every evening.  gabapentin (NEURONTIN) 100 mg capsule Take 1 Cap by mouth three (3) times daily. Indications: NEUROPATHIC PAIN (Patient not taking: Reported on 9/11/2017) 270 Cap 0    lisinopril (PRINIVIL, ZESTRIL) 10 mg tablet Take 10 mg by mouth daily.  amLODIPine (NORVASC) 10 mg tablet Take 10 mg by mouth daily.  pravastatin (PRAVACHOL) 40 mg tablet Take 40 mg by mouth nightly.  pantoprazole (PROTONIX) 40 mg tablet Take 40 mg by mouth daily. Allergies   Allergen Reactions    Mylanta [Aluminum-Magnesium Hydroxide] Hives          PHYSICAL EXAMINATION    Visit Vitals    /68    Pulse (!) 50    Resp 16    Ht 5' 7\" (1.702 m)    Wt 212 lb (96.2 kg)    BMI 33.2 kg/m2       CONSTITUTIONAL: NAD, A&O x 3  SENSATION: Intact to light touch throughout  RANGE OF MOTION: The patient has full passive range of motion in all four extremities.   MOTOR:  Straight Leg Raise: Negative, bilateral               Hip Flex Knee Ext Knee Flex Ankle DF GTE Ankle PF Tone   Right +4/5 +4/5 +4/5 +4/5 +4/5 +4/5 +4/5   Left +4/5 +4/5 +4/5 +4/5 +4/5 +4/5 +4/5       ASSESSMENT   Diagnoses and all orders for this visit:    1. Spondylosis without myelopathy or radiculopathy, lumbar region    2. Lumbar radiculopathy    3. Other intervertebral disc degeneration, lumbar region    Other orders  -     gabapentin (NEURONTIN) 300 mg capsule; Take 1 Cap by mouth three (3) times daily. IMPRESSION AND PLAN:  Patient wished to continue her current treatment. She was provided with refills of her Neurontin 300 mg TID. Patient will f/u with NP in 3 month's time. Written by Cherie Boyer, as dictated by [place], MD  I examined the patient, reviewed and agree with the note.

## 2017-09-11 NOTE — PROGRESS NOTES
Called Dr. Annalise Del Real office and spoke to [address] and informed her that the patient was in our office and her blood pressure was 150/68 and patient states she has taken both blood pressure medications and she has had a slight headache for the past 3 weeks. No dizziness. [address] states Dr. Seble García is in clinic and she will pass the message along and they will contact the patient for follow up because she is not sure if she would like to see the patient or put her down as a nurse visit. Patient has been notified and the provider is aware.

## 2017-10-12 ENCOUNTER — TELEPHONE (OUTPATIENT)
Dept: ORTHOPEDIC SURGERY | Age: 72
End: 2017-10-12

## 2017-10-12 NOTE — TELEPHONE ENCOUNTER
Dr. Tommy Regan prescribed the medication during the LOV for a 90 day supply. Patient instructed to contact the pharmacy for this medication.

## 2017-10-12 NOTE — TELEPHONE ENCOUNTER
PATIENT CALLED FOR REFILL ON GABAPENTIN MEDICATION FROM DR. CHANDLER. [facility] TEL. [phone]. PATIENT TEL. [phone].

## 2017-12-11 ENCOUNTER — OFFICE VISIT (OUTPATIENT)
Dept: ORTHOPEDIC SURGERY | Age: 72
End: 2017-12-11

## 2017-12-11 VITALS
BODY MASS INDEX: 32.33 KG/M2 | WEIGHT: 206 LBS | HEART RATE: 65 BPM | SYSTOLIC BLOOD PRESSURE: 173 MMHG | DIASTOLIC BLOOD PRESSURE: 72 MMHG | HEIGHT: 67 IN

## 2017-12-11 DIAGNOSIS — M54.16 LUMBAR RADICULOPATHY: Primary | ICD-10-CM

## 2017-12-11 DIAGNOSIS — M51.36 OTHER INTERVERTEBRAL DISC DEGENERATION, LUMBAR REGION: ICD-10-CM

## 2017-12-11 RX ORDER — GABAPENTIN 300 MG/1
CAPSULE ORAL
Qty: 360 CAP | Refills: 0 | Status: SHIPPED | OUTPATIENT
Start: 2017-12-11 | End: 2018-04-23 | Stop reason: ALTCHOICE

## 2017-12-11 NOTE — MR AVS SNAPSHOT
[address] [facility] 
 
 
 [address] [facility] 
[phone] Patient: Isaac Ndiaye MRN: [id number] Visit Information Date & Time Provider Department Dept. Phone Encounter #  
 12/11/2017  9:30 AM Dannielle Lutz NP [facility] [place] [phone] Follow-up Instructions Return in about 3 months (around 3/11/2018) for w/ FABIAN alejandro. Upcoming Health Maintenance Date Due Hepatitis C Screening 1945 DTaP/Tdap/Td series (1 - Tdap) 1/13/1966 BREAST CANCER SCRN MAMMOGRAM 1/13/1995 FOBT Q 1 YEAR AGE 50-75 1/13/1995 ZOSTER VACCINE AGE 60> 11/13/2004 GLAUCOMA SCREENING Q2Y 1/13/2010 OSTEOPOROSIS SCREENING (DEXA) 1/13/2010 Pneumococcal 65+ Low/Medium Risk (1 of 2 - PCV13) 1/13/2010 MEDICARE YEARLY EXAM 1/13/2010 Influenza Age 5 to Adult 8/1/2017 Allergies as of 12/11/2017  Review Complete On: 12/11/2017 By: Abhinav Bonilla Severity Noted Reaction Type Reactions Mylanta [Aluminum-magnesium Hydroxide]  08/11/2015    Hives Current Immunizations  Never Reviewed No immunizations on file. Not reviewed this visit You Were Diagnosed With   
  
 Codes Comments Lumbar radiculopathy    -  Primary ICD-10-CM: M54.16 
ICD-9-CM: 724.4 Other intervertebral disc degeneration, lumbar region     ICD-10-CM: M51.36 
ICD-9-CM: 722.52 Vitals BP Pulse Height(growth percentile) Weight(growth percentile) BMI OB Status 173/72 65 5' 7\" (1.702 m) 206 lb (93.4 kg) 32.26 kg/m2 Postmenopausal  
 Smoking Status Former Smoker Vitals History BMI and BSA Data Body Mass Index Body Surface Area  
 32.26 kg/m 2 2.1 m 2 Preferred Pharmacy Pharmacy Name Phone [phone] [address] [phone] Your Updated Medication List  
  
   
 This list is accurate as of: 12/11/17  9:52 AM.  Always use your most recent med list. amLODIPine 10 mg tablet Commonly known as:  Eltawanna Cisneroser Take 10 mg by mouth daily. aspirin 81 mg chewable tablet Take 81 mg by mouth daily. gabapentin 300 mg capsule Commonly known as:  NEURONTIN Take 2 tabs every morning, 1 tab in the afternoon and 1 tab in the evening.  
  
 hyoscyamine SL 0.125 mg SL tablet Commonly known as:  LEVSIN/SL  
0.125 mg.  
  
 lisinopril 10 mg tablet Commonly known as:  Robertha Roup Take 10 mg by mouth daily. LUMIGAN 0.03 % ophthalmic drops Generic drug:  bimatoprost  
Administer 1 Drop to both eyes every evening. metoprolol succinate 25 mg XL tablet Commonly known as:  TOPROL-XL  
TAKE 1 TABLET BY MOUTH ONCE A DAY. Olmesartan-amLODIPine-HCTZ 40-10-25 mg Tab  
  
 pantoprazole 40 mg tablet Commonly known as:  PROTONIX Take 40 mg by mouth daily. pravastatin 40 mg tablet Commonly known as:  PRAVACHOL Take 40 mg by mouth nightly. RESTASIS 0.05 % ophthalmic emulsion Generic drug:  cycloSPORINE  
  
 rosuvastatin 10 mg tablet Commonly known as:  CRESTOR Prescriptions Sent to Pharmacy Refills  
 gabapentin (NEURONTIN) 300 mg capsule 0 Sig: Take 2 tabs every morning, 1 tab in the afternoon and 1 tab in the evening. Class: Normal  
 Pharmacy: [address] [place] [address] #: [phone] Follow-up Instructions Return in about 3 months (around 3/11/2018) for w/ FABIAN alejandro. Patient Instructions Back Stretches: Exercises Your Care Instructions Here are some examples of exercises for stretching your back. Start each exercise slowly. Ease off the exercise if you start to have pain. Your doctor or physical therapist will tell you when you can start these exercises and which ones will work best for you. How to do the exercises Overhead stretch 1. Stand comfortably with your feet shoulder-width apart. 2. Looking straight ahead, raise both arms over your head and reach toward the ceiling. Do not allow your head to tilt back. 3. Hold for 15 to 30 seconds, then lower your arms to your sides. 4. Repeat 2 to 4 times. Side stretch 1. Stand comfortably with your feet shoulder-width apart. 2. Raise one arm over your head, and then lean to the other side. 3. Slide your hand down your leg as you let the weight of your arm gently stretch your side muscles. Hold for 15 to 30 seconds. 4. Repeat 2 to 4 times on each side. Press-up 1. Lie on your stomach, supporting your body with your forearms. 2. Press your elbows down into the floor to raise your upper back. As you do this, relax your stomach muscles and allow your back to arch without using your back muscles. As your press up, do not let your hips or pelvis come off the floor. 3. Hold for 15 to 30 seconds, then relax. 4. Repeat 2 to 4 times. Relax and rest 
 
1. Lie on your back with a rolled towel under your neck and a pillow under your knees. Extend your arms comfortably to your sides. 2. Relax and breathe normally. 3. Remain in this position for about 10 minutes. 4. If you can, do this 2 or 3 times each day. Follow-up care is a key part of your treatment and safety. Be sure to make and go to all appointments, and call your doctor if you are having problems. It's also a good idea to know your test results and keep a list of the medicines you take. Where can you learn more? Go to http://corinne-mynor.info/. Enter Y643 in the search box to learn more about \"Back Stretches: Exercises. \" Current as of: March 21, 2017 Content Version: 11.4 © 4476-2570 Healthwise, Incorporated. Care instructions adapted under license by [employer] (which disclaims liability or warranty for this information).  If you have questions about a medical condition or this instruction, always ask your healthcare professional. [address] [address] any warranty or liability for your use of this information. Acute Low Back Pain: Exercises Your Care Instructions Here are some examples of typical rehabilitation exercises for your condition. Start each exercise slowly. Ease off the exercise if you start to have pain. Your doctor or physical therapist will tell you when you can start these exercises and which ones will work best for you. When you are not being active, find a comfortable position for rest. Some people are comfortable on the floor or a medium-firm bed with a small pillow under their head and another under their knees. Some people prefer to lie on their side with a pillow between their knees. Don't stay in one position for too long. Take short walks (10 to 20 minutes) every 2 to 3 hours. Avoid slopes, hills, and stairs until you feel better. Walk only distances you can manage without pain, especially leg pain. How to do the exercises Back stretches 5. Get down on your hands and knees on the floor. 6. Relax your head and allow it to droop. Round your back up toward the ceiling until you feel a nice stretch in your upper, middle, and lower back. Hold this stretch for as long as it feels comfortable, or about 15 to 30 seconds. 7. Return to the starting position with a flat back while you are on your hands and knees. 8. Let your back sway by pressing your stomach toward the floor. Lift your buttocks toward the ceiling. 9. Hold this position for 15 to 30 seconds. 10. Repeat 2 to 4 times. Follow-up care is a key part of your treatment and safety. Be sure to make and go to all appointments, and call your doctor if you are having problems. It's also a good idea to know your test results and keep a list of the medicines you take. Where can you learn more? Go to http://corinne-mynor.info/. Enter S519 in the search box to learn more about \"Acute Low Back Pain: Exercises. \" Current as of: March 21, 2017 Content Version: 11.4 © 1257-2298 [employer] ([employer]). Care instructions adapted under license by [employer] (which disclaims liability or warranty for this information). If you have questions about a medical condition or this instruction, always ask your healthcare professional. [address] any warranty or liability for your use of this information. Low Back Pain: Exercises Your Care Instructions Here are some examples of typical rehabilitation exercises for your condition. Start each exercise slowly. Ease off the exercise if you start to have pain. Your doctor or physical therapist will tell you when you can start these exercises and which ones will work best for you. How to do the exercises Press-up 11. Lie on your stomach, supporting your body with your forearms. 12. Press your elbows down into the floor to raise your upper back. As you do this, relax your stomach muscles and allow your back to arch without using your back muscles. As your press up, do not let your hips or pelvis come off the floor. 13. Hold for 15 to 30 seconds, then relax. 14. Repeat 2 to 4 times. Alternate arm and leg (bird dog) exercise Do this exercise slowly. Try to keep your body straight at all times, and do not let one hip drop lower than the other. 5. Start on the floor, on your hands and knees. 6. Tighten your belly muscles. 7. Raise one leg off the floor, and hold it straight out behind you. Be careful not to let your hip drop down, because that will twist your trunk. 8. Hold for about 6 seconds, then lower your leg and switch to the other leg. 9. Repeat 8 to 12 times on each leg. 10. Over time, work up to holding for 10 to 30 seconds each time.  
11. If you feel stable and secure with your leg raised, try raising the opposite arm straight out in front of you at the same time. Knee-to-chest exercise 5. Lie on your back with your knees bent and your feet flat on the floor. 6. Bring one knee to your chest, keeping the other foot flat on the floor (or keeping the other leg straight, whichever feels better on your lower back). 7. Keep your lower back pressed to the floor. Hold for at least 15 to 30 seconds. 8. Relax, and lower the knee to the starting position. 9. Repeat with the other leg. Repeat 2 to 4 times with each leg. 10. To get more stretch, put your other leg flat on the floor while pulling your knee to your chest. 
Curl-ups 5. Lie on the floor on your back with your knees bent at a 90-degree angle. Your feet should be flat on the floor, about 12 inches from your buttocks. 6. Cross your arms over your chest. If this bothers your neck, try putting your hands behind your neck (not your head), with your elbows spread apart. 7. Slowly tighten your belly muscles and raise your shoulder blades off the floor. 8. Keep your head in line with your body, and do not press your chin to your chest. 
9. Hold this position for 1 or 2 seconds, then slowly lower yourself back down to the floor. 10. Repeat 8 to 12 times. Pelvic tilt exercise 1. Lie on your back with your knees bent. 2. \"Brace\" your stomach. This means to tighten your muscles by pulling in and imagining your belly button moving toward your spine. You should feel like your back is pressing to the floor and your hips and pelvis are rocking back. 3. Hold for about 6 seconds while you breathe smoothly. 4. Repeat 8 to 12 times. Heel dig bridging 1. Lie on your back with both knees bent and your ankles bent so that only your heels are digging into the floor. Your knees should be bent about 90 degrees.  
2. Then push your heels into the floor, squeeze your buttocks, and lift your hips off the floor until your shoulders, hips, and knees are all in a straight line. 3. Hold for about 6 seconds as you continue to breathe normally, and then slowly lower your hips back down to the floor and rest for up to 10 seconds. 4. Do 8 to 12 repetitions. Hamstring stretch in doorway 1. Lie on your back in a doorway, with one leg through the open door. 2. Slide your leg up the wall to straighten your knee. You should feel a gentle stretch down the back of your leg. 3. Hold the stretch for at least 15 to 30 seconds. Do not arch your back, point your toes, or bend either knee. Keep one heel touching the floor and the other heel touching the wall. 4. Repeat with your other leg. 5. Do 2 to 4 times for each leg. Hip flexor stretch 1. Kneel on the floor with one knee bent and one leg behind you. Place your forward knee over your foot. Keep your other knee touching the floor. 2. Slowly push your hips forward until you feel a stretch in the upper thigh of your rear leg. 3. Hold the stretch for at least 15 to 30 seconds. Repeat with your other leg. 4. Do 2 to 4 times on each side. Wall sit 1. Stand with your back 10 to 12 inches away from a wall. 2. Lean into the wall until your back is flat against it. 3. Slowly slide down until your knees are slightly bent, pressing your lower back into the wall. 4. Hold for about 6 seconds, then slide back up the wall. 5. Repeat 8 to 12 times. Follow-up care is a key part of your treatment and safety. Be sure to make and go to all appointments, and call your doctor if you are having problems. It's also a good idea to know your test results and keep a list of the medicines you take. Where can you learn more? Go to http://corinne-mynor.info/. Enter S456 in the search box to learn more about \"Low Back Pain: Exercises. \" Current as of: March 21, 2017 Content Version: 11.4 © 6570-0020 Healthwise, Incorporated.  Care instructions adapted under license by [address] (which disclaims liability or warranty for this information). If you have questions about a medical condition or this instruction, always ask your healthcare professional. [address] any warranty or liability for your use of this information. Introducing [facility]! [employer] introduces [employer] patient portal. Now you can access parts of your medical record, email your doctor's office, and request medication refills online. 1. In your internet browser, go to [email or web address] 2. Click on the First Time User? Click Here link in the Sign In box. You will see the New Member Sign Up page. 3. Enter your [employer] Access Code exactly as it appears below. You will not need to use this code after youve completed the sign-up process. If you do not sign up before the expiration date, you must request a new code. · [employer] Access Code: [email or web address] Expires: 3/8/2018  9:45 AM 
 
4. Enter the last four digits of your Social Security Number (xxxx) and Date of Birth (mm/dd/yyyy) as indicated and click Submit. You will be taken to the next sign-up page. 5. Create a We Cluster ID. This will be your We [employer] login ID and cannot be changed, so think of one that is secure and easy to remember. 6. Create a [employer] password. You can change your password at any time. 7. Enter your Password Reset Question and Answer. This can be used at a later time if you forget your password. 8. Enter your e-mail address. You will receive e-mail notification when new information is available in [address] 9. Click Sign Up. You can now view and download portions of your medical record. 10. Click the Download Summary menu link to download a portable copy of your medical information. If you have questions, please visit the Frequently Asked Questions section of the [employer] website.  Remember, [employer] is NOT to be used for urgent needs. For medical emergencies, dial 911. Now available from your iPhone and Android! Please provide this summary of care documentation to your next provider. Your primary care clinician is listed as Marissa Del Real. If you have any questions after today's visit, please call [phone].

## 2017-12-11 NOTE — PROGRESS NOTES
[address]  [address], [address]  [place]  Phone: [phone]  Fax: [phone]    Chapo Parry  : 1945  PCP: Blanka Christopher DO    PROGRESS NOTE    HISTORY OF PRESENT ILLNESS:  Chief Complaint   Patient presents with    Follow-up     lumbar LLE pain terminating at mid lower leg     Vickie Gordon is a 67 y.o.  female with history of chronic recurrent low back pain radiating into the LLE x 6 years, progressing x 1 year. No specific injury/trauma. Pt reports limitations with standing and walking tolerance. She has had blocks in the past with relief. Symptoms consistent for stenosis. She is not a candidate for Topamax secondary to her h/o glaucoma. She has a hx of DM. She currently takes Neurontin 300 mg TID. She continues to have left low back pain radiating to her left lower leg down to her calf. She has more good days then bad but does report a decreased walking tolerance due to the leg pain. Denies bladder/bowel dysfunction, saddle paresthesia, weakness, gait disturbance, or other neurological deficit. States she has been using Gabapentin with moderate relie. Pt at this time desires to  continue with current care/proceed with medication evaluation. ASSESSMENT  67 y.o. female with lumbar and left leg pain. Diagnoses and all orders for this visit:    1. Lumbar radiculopathy  -     gabapentin (NEURONTIN) 300 mg capsule; Take 2 tabs every morning, 1 tab in the afternoon and 1 tab in the evening. 2. Other intervertebral disc degeneration, lumbar region  -     gabapentin (NEURONTIN) 300 mg capsule; Take 2 tabs every morning, 1 tab in the afternoon and 1 tab in the evening. IMPRESSION/PLAN    1) Pt was given information on lumbar exercises. 2) Increase gabapentin to 600 mg every morning, 300 mg afternoon and in the evening.   3) May increase to 600 mg TID tapering up as directed if the additional morning dose is not beneficial. May call for refill  4) Ms. Arnaldo Schmidt has a reminder for a \"due or due soon\" health maintenance. I have asked that she contact her primary care provider, Tom Reynolds DO, for follow-up on this health maintenance. 5) We have informed patient to notify us for immediate appointment if he has any worsening neurogical symptoms or if an emergency situation presents, then call 911  6) Pt will follow-up in 3 months w/ NP. PAST MEDICAL HISTORY  Past Medical History:   Diagnosis Date    Arthritis     Diabetes ([address]     Hypertension     Renal lesion     Stroke [facility])         MEDICATIONS  Current Outpatient Prescriptions   Medication Sig Dispense Refill    gabapentin (NEURONTIN) 300 mg capsule Take 2 tabs every morning, 1 tab in the afternoon and 1 tab in the evening. 360 Cap 0    hyoscyamine SL (LEVSIN/SL) 0.125 mg SL tablet 0.125 mg.      cycloSPORINE (RESTASIS) 0.05 % ophthalmic emulsion       Olmesartan-amLODIPine-HCTZ 40-10-25 mg tab       metoprolol succinate (TOPROL-XL) 25 mg XL tablet TAKE 1 TABLET BY MOUTH ONCE A DAY. 3    lisinopril (PRINIVIL, ZESTRIL) 10 mg tablet Take 10 mg by mouth daily.  aspirin 81 mg chewable tablet Take 81 mg by mouth daily.  amLODIPine (NORVASC) 10 mg tablet Take 10 mg by mouth daily.  pravastatin (PRAVACHOL) 40 mg tablet Take 40 mg by mouth nightly.  bimatoprost (LUMIGAN) 0.03 % ophthalmic drops Administer 1 Drop to both eyes every evening.  rosuvastatin (CRESTOR) 10 mg tablet       pantoprazole (PROTONIX) 40 mg tablet Take 40 mg by mouth daily. ALLERGIES  Allergies   Allergen Reactions    Mylanta [Aluminum-Magnesium Hydroxide] Hives       SOCIAL HISTORY    Social History     Social History    Marital status:      Spouse name: N/A    Number of children: N/A    Years of education: N/A     Occupational History    Not on file.      Social History Main Topics    Smoking status: Former Smoker    Smokeless tobacco: Never Used    Alcohol use 0.0 oz/week     0 Standard drinks or equivalent per week    Drug use: No    Sexual activity: Not on file     Other Topics Concern    Not on file     Social History Narrative       SUBJECTIVE        Pain Scale: 0 - No pain/10    Pain Assessment  12/11/2017   Location of Pain Back;Leg   Location Modifiers Left   Severity of Pain 0   Quality of Pain Aching   Quality of Pain Comment -   Duration of Pain -   Frequency of Pain Intermittent   Aggravating Factors Standing;Walking   Aggravating Factors Comment -   Limiting Behavior Some   Relieving Factors Rest   Relieving Factors Comment -   Result of Injury No       Accompanied by family member. REVIEW OF SYSTEMS  ROS    Constitutional: Negative for fever, chills, or weight change. Respiratory: Negative for cough or shortness of breath. Cardiovascular: Negative for chest pain or palpitations. Gastrointestinal: Negative for acid reflux, change in bowel habits, or constipation. Genitourinary: Negative for incontinence, dysuria and flank pain. Musculoskeletal: Positive for low back pain. Skin: Negative for rash. Neurological: Negative for headaches, dizziness, or numbness. Endo/Heme/Allergies: Negative . Psychiatric/Behavioral: Negative. PHYSICAL EXAMINATION  Visit Vitals    /72    Pulse 65    Ht 5' 7\" (1.702 m)    Wt 206 lb (93.4 kg)    BMI 32.26 kg/m2       Constitutional: Well developed,  well nourished,  awake, alert, and in no acute distress. Neurological:  Sensation to light touch is intact. Psychiatric: Affect and mood are appropriate. Integumentary: No rashes or abrasions noted on exposed areas,  warm, dry and intact. Cardiovascular/Peripheral Vascular:  No peripheral edema is noted. Lymphatic:  No evidence of lymphedema. No cervical lymphadenopathy. SPINE/MUSCULOSKELETAL EXAM      Lumbar spine:  No rash, ecchymosis, or gross obliquity. No fasciculations. No focal atrophy is noted. Range of motion is intact. No Tenderness to palpation . SI joints non-tender. Trochanters non tender. Musculoskeletal:  No pain with extension, axial loading, or forward flexion. No pain with internal or external rotation of her hips. MOTOR     Hip Flex  Quads Hamstrings Ankle DF EHL Ankle PF   Right +4/5 +4/5 +4/5 +4/5 +4/5 +4/5   Left +4/5 +4/5 +4/5 +4/5 +4/5 +4/5       Straight Leg raise negative bilaterally. normal gait and station    Ambulation without assistive device. full weight bearing, non-antalgic gait.     Lillie Armijo, NP

## 2017-12-11 NOTE — PATIENT INSTRUCTIONS
Back Stretches: Exercises  Your Care Instructions  Here are some examples of exercises for stretching your back. Start each exercise slowly. Ease off the exercise if you start to have pain. Your doctor or physical therapist will tell you when you can start these exercises and which ones will work best for you. How to do the exercises  Overhead stretch    1. Stand comfortably with your feet shoulder-width apart. 2. Looking straight ahead, raise both arms over your head and reach toward the ceiling. Do not allow your head to tilt back. 3. Hold for 15 to 30 seconds, then lower your arms to your sides. 4. Repeat 2 to 4 times. Side stretch    1. Stand comfortably with your feet shoulder-width apart. 2. Raise one arm over your head, and then lean to the other side. 3. Slide your hand down your leg as you let the weight of your arm gently stretch your side muscles. Hold for 15 to 30 seconds. 4. Repeat 2 to 4 times on each side. Press-up    1. Lie on your stomach, supporting your body with your forearms. 2. Press your elbows down into the floor to raise your upper back. As you do this, relax your stomach muscles and allow your back to arch without using your back muscles. As your press up, do not let your hips or pelvis come off the floor. 3. Hold for 15 to 30 seconds, then relax. 4. Repeat 2 to 4 times. Relax and rest    1. Lie on your back with a rolled towel under your neck and a pillow under your knees. Extend your arms comfortably to your sides. 2. Relax and breathe normally. 3. Remain in this position for about 10 minutes. 4. If you can, do this 2 or 3 times each day. Follow-up care is a key part of your treatment and safety. Be sure to make and go to all appointments, and call your doctor if you are having problems. It's also a good idea to know your test results and keep a list of the medicines you take. Where can you learn more? Go to http://corinne-mynor.info/.   Enter D652 in the search box to learn more about \"Back Stretches: Exercises. \"  Current as of: March 21, 2017  Content Version: 11.4  © 8914-3846 [employer]. Care instructions adapted under license by [employer] (which disclaims liability or warranty for this information). If you have questions about a medical condition or this instruction, always ask your healthcare professional. [address] any warranty or liability for your use of this information. Acute Low Back Pain: Exercises  Your Care Instructions  Here are some examples of typical rehabilitation exercises for your condition. Start each exercise slowly. Ease off the exercise if you start to have pain. Your doctor or physical therapist will tell you when you can start these exercises and which ones will work best for you. When you are not being active, find a comfortable position for rest. Some people are comfortable on the floor or a medium-firm bed with a small pillow under their head and another under their knees. Some people prefer to lie on their side with a pillow between their knees. Don't stay in one position for too long. Take short walks (10 to 20 minutes) every 2 to 3 hours. Avoid slopes, hills, and stairs until you feel better. Walk only distances you can manage without pain, especially leg pain. How to do the exercises  Back stretches    5. Get down on your hands and knees on the floor. 6. Relax your head and allow it to droop. Round your back up toward the ceiling until you feel a nice stretch in your upper, middle, and lower back. Hold this stretch for as long as it feels comfortable, or about 15 to 30 seconds. 7. Return to the starting position with a flat back while you are on your hands and knees. 8. Let your back sway by pressing your stomach toward the floor. Lift your buttocks toward the ceiling. 9. Hold this position for 15 to 30 seconds. 10. Repeat 2 to 4 times.   Follow-up care is a key part of your treatment and safety. Be sure to make and go to all appointments, and call your doctor if you are having problems. It's also a good idea to know your test results and keep a list of the medicines you take. Where can you learn more? Go to http://corinne-mynor.info/. Enter N046 in the search box to learn more about \"Acute Low Back Pain: Exercises. \"  Current as of: March 21, 2017  Content Version: 11.4  © 1345-5083 [employer]. Care instructions adapted under license by [employer] (which disclaims liability or warranty for this information). If you have questions about a medical condition or this instruction, always ask your healthcare professional. [address] any warranty or liability for your use of this information. Low Back Pain: Exercises  Your Care Instructions  Here are some examples of typical rehabilitation exercises for your condition. Start each exercise slowly. Ease off the exercise if you start to have pain. Your doctor or physical therapist will tell you when you can start these exercises and which ones will work best for you. How to do the exercises  Press-up    11. Lie on your stomach, supporting your body with your forearms. 12. Press your elbows down into the floor to raise your upper back. As you do this, relax your stomach muscles and allow your back to arch without using your back muscles. As your press up, do not let your hips or pelvis come off the floor. 13. Hold for 15 to 30 seconds, then relax. 14. Repeat 2 to 4 times. Alternate arm and leg (bird dog) exercise    Do this exercise slowly. Try to keep your body straight at all times, and do not let one hip drop lower than the other. 5. Start on the floor, on your hands and knees. 6. Tighten your belly muscles. 7. Raise one leg off the floor, and hold it straight out behind you.  Be careful not to let your hip drop down, because that will twist your trunk. 8. Hold for about 6 seconds, then lower your leg and switch to the other leg. 9. Repeat 8 to 12 times on each leg. 10. Over time, work up to holding for 10 to 30 seconds each time. 11. If you feel stable and secure with your leg raised, try raising the opposite arm straight out in front of you at the same time. Knee-to-chest exercise    5. Lie on your back with your knees bent and your feet flat on the floor. 6. Bring one knee to your chest, keeping the other foot flat on the floor (or keeping the other leg straight, whichever feels better on your lower back). 7. Keep your lower back pressed to the floor. Hold for at least 15 to 30 seconds. 8. Relax, and lower the knee to the starting position. 9. Repeat with the other leg. Repeat 2 to 4 times with each leg. 10. To get more stretch, put your other leg flat on the floor while pulling your knee to your chest.  Curl-ups    5. Lie on the floor on your back with your knees bent at a 90-degree angle. Your feet should be flat on the floor, about 12 inches from your buttocks. 6. Cross your arms over your chest. If this bothers your neck, try putting your hands behind your neck (not your head), with your elbows spread apart. 7. Slowly tighten your belly muscles and raise your shoulder blades off the floor. 8. Keep your head in line with your body, and do not press your chin to your chest.  9. Hold this position for 1 or 2 seconds, then slowly lower yourself back down to the floor. 10. Repeat 8 to 12 times. Pelvic tilt exercise    1. Lie on your back with your knees bent. 2. \"Brace\" your stomach. This means to tighten your muscles by pulling in and imagining your belly button moving toward your spine. You should feel like your back is pressing to the floor and your hips and pelvis are rocking back. 3. Hold for about 6 seconds while you breathe smoothly. 4. Repeat 8 to 12 times. Heel dig bridging    1.  Lie on your back with both knees bent and your ankles bent so that only your heels are digging into the floor. Your knees should be bent about 90 degrees. 2. Then push your heels into the floor, squeeze your buttocks, and lift your hips off the floor until your shoulders, hips, and knees are all in a straight line. 3. Hold for about 6 seconds as you continue to breathe normally, and then slowly lower your hips back down to the floor and rest for up to 10 seconds. 4. Do 8 to 12 repetitions. Hamstring stretch in doorway    1. Lie on your back in a doorway, with one leg through the open door. 2. Slide your leg up the wall to straighten your knee. You should feel a gentle stretch down the back of your leg. 3. Hold the stretch for at least 15 to 30 seconds. Do not arch your back, point your toes, or bend either knee. Keep one heel touching the floor and the other heel touching the wall. 4. Repeat with your other leg. 5. Do 2 to 4 times for each leg. Hip flexor stretch    1. Kneel on the floor with one knee bent and one leg behind you. Place your forward knee over your foot. Keep your other knee touching the floor. 2. Slowly push your hips forward until you feel a stretch in the upper thigh of your rear leg. 3. Hold the stretch for at least 15 to 30 seconds. Repeat with your other leg. 4. Do 2 to 4 times on each side. Wall sit    1. Stand with your back 10 to 12 inches away from a wall. 2. Lean into the wall until your back is flat against it. 3. Slowly slide down until your knees are slightly bent, pressing your lower back into the wall. 4. Hold for about 6 seconds, then slide back up the wall. 5. Repeat 8 to 12 times. Follow-up care is a key part of your treatment and safety. Be sure to make and go to all appointments, and call your doctor if you are having problems. It's also a good idea to know your test results and keep a list of the medicines you take. Where can you learn more? Go to http://corinne-mynor.info/.   Enter A169 in the search box to learn more about \"Low Back Pain: Exercises. \"  Current as of: March 21, 2017  Content Version: 11.4  © 0429-7281 [employer]. Care instructions adapted under license by [employer] (which disclaims liability or warranty for this information). If you have questions about a medical condition or this instruction, always ask your healthcare professional. [place] [address] any warranty or liability for your use of this information.

## 2018-03-13 ENCOUNTER — HOSPITAL ENCOUNTER (EMERGENCY)
Age: 73
Discharge: HOME | End: 2018-03-13
Attending: EMERGENCY MEDICINE
Payer: MEDICARE

## 2018-03-13 ENCOUNTER — APPOINTMENT (OUTPATIENT)
Dept: GENERAL RADIOLOGY | Age: 73
End: 2018-03-13
Attending: PHYSICIAN ASSISTANT
Payer: MEDICARE

## 2018-03-13 VITALS
SYSTOLIC BLOOD PRESSURE: 171 MMHG | TEMPERATURE: 98.2 F | HEIGHT: 63 IN | RESPIRATION RATE: 18 BRPM | DIASTOLIC BLOOD PRESSURE: 82 MMHG | HEART RATE: 68 BPM | OXYGEN SATURATION: 94 %

## 2018-03-13 DIAGNOSIS — M79.89 LEG SWELLING: Primary | ICD-10-CM

## 2018-03-13 DIAGNOSIS — R03.0 ELEVATED BLOOD PRESSURE READING: ICD-10-CM

## 2018-03-13 LAB
ALBUMIN SERPL-MCNC: 3.5 G/DL (ref 3.4–5)
ALBUMIN/GLOB SERPL: 0.9 {RATIO} (ref 0.8–1.7)
ALP SERPL-CCNC: 107 U/L (ref 45–117)
ALT SERPL-CCNC: 22 U/L (ref 13–56)
ANION GAP SERPL CALC-SCNC: 6 MMOL/L (ref 3–18)
APPEARANCE UR: CLEAR
AST SERPL-CCNC: 20 U/L (ref 15–37)
BASOPHILS # BLD: 0 K/UL (ref 0–0.06)
BASOPHILS NFR BLD: 0 % (ref 0–2)
BILIRUB SERPL-MCNC: 0.4 MG/DL (ref 0.2–1)
BILIRUB UR QL: NEGATIVE
BNP SERPL-MCNC: 274 PG/ML (ref 0–900)
BUN SERPL-MCNC: 15 MG/DL (ref 7–18)
BUN/CREAT SERPL: 12 (ref 12–20)
CALCIUM SERPL-MCNC: 9.5 MG/DL (ref 8.5–10.1)
CHLORIDE SERPL-SCNC: 104 MMOL/L (ref 100–108)
CO2 SERPL-SCNC: 33 MMOL/L (ref 21–32)
COLOR UR: YELLOW
CREAT SERPL-MCNC: 1.25 MG/DL (ref 0.6–1.3)
D DIMER PPP FEU-MCNC: 0.39 UG/ML(FEU)
DIFFERENTIAL METHOD BLD: ABNORMAL
EOSINOPHIL # BLD: 0.2 K/UL (ref 0–0.4)
EOSINOPHIL NFR BLD: 3 % (ref 0–5)
ERYTHROCYTE [DISTWIDTH] IN BLOOD BY AUTOMATED COUNT: 15 % (ref 11.6–14.5)
GLOBULIN SER CALC-MCNC: 3.9 G/DL (ref 2–4)
GLUCOSE SERPL-MCNC: 110 MG/DL (ref 74–99)
GLUCOSE UR STRIP.AUTO-MCNC: NEGATIVE MG/DL
HCT VFR BLD AUTO: 44.8 % (ref 35–45)
HGB BLD-MCNC: 13.6 G/DL (ref 12–16)
HGB UR QL STRIP: NEGATIVE
KETONES UR QL STRIP.AUTO: NEGATIVE MG/DL
LEUKOCYTE ESTERASE UR QL STRIP.AUTO: NEGATIVE
LYMPHOCYTES # BLD: 3 K/UL (ref 0.9–3.6)
LYMPHOCYTES NFR BLD: 43 % (ref 21–52)
MCH RBC QN AUTO: 27.1 PG (ref 24–34)
MCHC RBC AUTO-ENTMCNC: 30.4 G/DL (ref 31–37)
MCV RBC AUTO: 89.4 FL (ref 74–97)
MONOCYTES # BLD: 0.8 K/UL (ref 0.05–1.2)
MONOCYTES NFR BLD: 12 % (ref 3–10)
NEUTS SEG # BLD: 2.9 K/UL (ref 1.8–8)
NEUTS SEG NFR BLD: 42 % (ref 40–73)
NITRITE UR QL STRIP.AUTO: NEGATIVE
PH UR STRIP: 5.5 [PH] (ref 5–8)
PLATELET # BLD AUTO: 268 K/UL (ref 135–420)
PMV BLD AUTO: 9.6 FL (ref 9.2–11.8)
POTASSIUM SERPL-SCNC: 3.8 MMOL/L (ref 3.5–5.5)
PROT SERPL-MCNC: 7.4 G/DL (ref 6.4–8.2)
PROT UR STRIP-MCNC: NEGATIVE MG/DL
RBC # BLD AUTO: 5.01 M/UL (ref 4.2–5.3)
SODIUM SERPL-SCNC: 143 MMOL/L (ref 136–145)
SP GR UR REFRACTOMETRY: 1.01 (ref 1–1.03)
UROBILINOGEN UR QL STRIP.AUTO: 0.2 EU/DL (ref 0.2–1)
WBC # BLD AUTO: 6.9 K/UL (ref 4.6–13.2)

## 2018-03-13 PROCEDURE — 71046 X-RAY EXAM CHEST 2 VIEWS: CPT

## 2018-03-13 PROCEDURE — 99283 EMERGENCY DEPT VISIT LOW MDM: CPT

## 2018-03-13 PROCEDURE — 80053 COMPREHEN METABOLIC PANEL: CPT | Performed by: PHYSICIAN ASSISTANT

## 2018-03-13 PROCEDURE — 83880 ASSAY OF NATRIURETIC PEPTIDE: CPT | Performed by: PHYSICIAN ASSISTANT

## 2018-03-13 PROCEDURE — 93005 ELECTROCARDIOGRAM TRACING: CPT

## 2018-03-13 PROCEDURE — 85025 COMPLETE CBC W/AUTO DIFF WBC: CPT | Performed by: PHYSICIAN ASSISTANT

## 2018-03-13 PROCEDURE — 81003 URINALYSIS AUTO W/O SCOPE: CPT | Performed by: PHYSICIAN ASSISTANT

## 2018-03-13 PROCEDURE — 85379 FIBRIN DEGRADATION QUANT: CPT | Performed by: PHYSICIAN ASSISTANT

## 2018-03-13 NOTE — ED NOTES
Yue Antunez is a 68 y.o. female that was discharged in stable condition. The patients diagnosis, condition and treatment were explained to  patient and aftercare instructions were given. The patient verbalized understanding. Patient armband removed and shredded.

## 2018-03-13 NOTE — ED PROVIDER NOTES
EMERGENCY DEPARTMENT HISTORY AND PHYSICAL EXAM    5:01 PM      Date: 3/13/2018  Patient Name: Eric Delgadillo    History of Presenting Illness     Chief Complaint   Patient presents with    Leg Swelling         History Provided By: Patient    Chief Complaint: leg swelling  Duration:  Weeks  Timing:  Constant  Location: both lower legs  Quality: Aching  Severity: Moderate  Modifying Factors: Started when she started taking her new HTN medication  Associated Symptoms: pain      Additional History (Context): Eric Delgadillo is a 68 y.o. female with diabetes, hypertension and Stroke who presents with bilateral lower leg swelling for the past month. States it started when she started taking her new HTN medication. States pain has increased over the past couple of days. Denies trauma, SOB, Fever, n/v.    PCP: Deysi Hunt, DO    Current Outpatient Prescriptions   Medication Sig Dispense Refill    gabapentin (NEURONTIN) 300 mg capsule Take 2 tabs every morning, 1 tab in the afternoon and 1 tab in the evening. 360 Cap 0    hyoscyamine SL (LEVSIN/SL) 0.125 mg SL tablet 0.125 mg.      cycloSPORINE (RESTASIS) 0.05 % ophthalmic emulsion       Olmesartan-amLODIPine-HCTZ 40-10-25 mg tab       rosuvastatin (CRESTOR) 10 mg tablet       metoprolol succinate (TOPROL-XL) 25 mg XL tablet TAKE 1 TABLET BY MOUTH ONCE A DAY. 3    aspirin 81 mg chewable tablet Take 81 mg by mouth daily.  bimatoprost (LUMIGAN) 0.03 % ophthalmic drops Administer 1 Drop to both eyes every evening.          Past History     Past Medical History:  Past Medical History:   Diagnosis Date    Arthritis     Diabetes ([address]     Hypertension     Renal lesion     Stroke [facility])        Past Surgical History:  Past Surgical History:   Procedure Laterality Date    ABDOMEN SURGERY PROC UNLISTED      HX CHOLECYSTECTOMY         Family History:  Family History   Problem Relation Age of Onset    Hypertension Mother     Hypertension Father    [facility] Hypertension Sister     Hypertension Brother        Social History:  Social History   Substance Use Topics    Smoking status: Former Smoker    Smokeless tobacco: Never Used    Alcohol use 0.0 oz/week     0 Standard drinks or equivalent per week       Allergies: Allergies   Allergen Reactions    Mylanta [Aluminum-Magnesium Hydroxide] Hives         Review of Systems     Constitutional:  Denies malaise, fever, chills. Head:  Denies injury. Face:  Denies injury or pain. ENMT:  Denies sore throat. Neck:  Denies injury or pain. Chest:  Denies injury. Cardiac:  Denies chest pain or palpitations. Respiratory:  Denies cough, wheezing, difficulty breathing, shortness of breath. GI/ABD:  Denies injury, pain, distention, nausea, vomiting, diarrhea. :  Denies injury, pain, dysuria or urgency. Back:  Denies injury or pain. Pelvis:  Denies injury or pain. Extremity/MS:  Leg swelling  Neuro:  Denies headache, LOC, dizziness, neurologic symptoms/deficits/paresthesias. Skin: Denies injury, rash, itching or skin changes. Physical Exam     Visit Vitals    /82 (BP 1 Location: Left arm, BP Patient Position: At rest)    Pulse 68    Temp 98.2 °F (36.8 °C)    Resp 18    Ht 5' 3\" (1.6 m)    SpO2 94%       CONSTITUTIONAL: Alert, in no apparent distress; well-developed; well-nourished. HEAD:  Normocephalic, atraumatic. EYES: PERRL; EOM's intact. ENTM: Nose: No rhinorrhea; Throat: mucous membranes moist. Posterior pharynx-normal.  Neck:  No JVD, supple without lymphadenopathy. RESP: Chest clear, equal breath sounds. CV: S1 and S2 WNL; No murmurs, gallops or rubs. GI: Abdomen soft and non-tender. No masses or organomegaly. UPPER EXT:  Normal inspection. LOWER EXT: Bilateral lower leg 2+ pitting edema, Right calf more tender than left, good pedal pulse, good cap refill, sensation intact, FROM. 5/5 strength  NEURO: strength 5/5 and sym, sensation intact.    SKIN: No rashes; Normal for age and stage. PSYCH:  Alert and oriented, normal affect. Diagnostic Study Results     Labs -  Recent Results (from the past 12 hour(s))   EKG, 12 LEAD, INITIAL    Collection Time: 03/13/18  4:43 PM   Result Value Ref Range    Ventricular Rate 66 BPM    Atrial Rate 66 BPM    P-R Interval 180 ms    QRS Duration 86 ms    Q-T Interval 414 ms    QTC Calculation (Bezet) 434 ms    Calculated P Axis 53 degrees    Calculated R Axis 21 degrees    Calculated T Axis 34 degrees    Diagnosis       Sinus rhythm with occasional premature ventricular complexes  Otherwise normal ECG  No previous ECGs available     METABOLIC PANEL, COMPREHENSIVE    Collection Time: 03/13/18  4:45 PM   Result Value Ref Range    Sodium 143 136 - 145 mmol/L    Potassium 3.8 3.5 - 5.5 mmol/L    Chloride 104 100 - 108 mmol/L    CO2 33 (H) 21 - 32 mmol/L    Anion gap 6 3.0 - 18 mmol/L    Glucose 110 (H) 74 - 99 mg/dL    BUN 15 7.0 - 18 MG/DL    Creatinine 1.25 0.6 - 1.3 MG/DL    BUN/Creatinine ratio 12 12 - 20      GFR est AA 51 (L) >60 ml/min/1.73m2    GFR est non-AA 42 (L) >60 ml/min/1.73m2    Calcium 9.5 8.5 - 10.1 MG/DL    Bilirubin, total 0.4 0.2 - 1.0 MG/DL    ALT (SGPT) 22 13 - 56 U/L    AST (SGOT) 20 15 - 37 U/L    Alk.  phosphatase 107 45 - 117 U/L    Protein, total 7.4 6.4 - 8.2 g/dL    Albumin 3.5 3.4 - 5.0 g/dL    Globulin 3.9 2.0 - 4.0 g/dL    A-G Ratio 0.9 0.8 - 1.7     URINALYSIS W/ RFLX MICROSCOPIC    Collection Time: 03/13/18  4:45 PM   Result Value Ref Range    Color YELLOW      Appearance CLEAR      Specific gravity 1.013 1.005 - 1.030      pH (UA) 5.5 5.0 - 8.0      Protein NEGATIVE  NEG mg/dL    Glucose NEGATIVE  NEG mg/dL    Ketone NEGATIVE  NEG mg/dL    Bilirubin NEGATIVE  NEG      Blood NEGATIVE  NEG      Urobilinogen 0.2 0.2 - 1.0 EU/dL    Nitrites NEGATIVE  NEG      Leukocyte Esterase NEGATIVE  NEG     D DIMER    Collection Time: 03/13/18  4:45 PM   Result Value Ref Range    D DIMER 0.39 <0.46 ug/ml(FEU)   CBC WITH AUTOMATED DIFF    Collection Time: 03/13/18  4:45 PM   Result Value Ref Range    WBC 6.9 4.6 - 13.2 K/uL    RBC 5.01 4.20 - 5.30 M/uL    HGB 13.6 12.0 - 16.0 g/dL    HCT 44.8 35.0 - 45.0 %    MCV 89.4 74.0 - 97.0 FL    MCH 27.1 24.0 - 34.0 PG    MCHC 30.4 (L) 31.0 - 37.0 g/dL    RDW 15.0 (H) 11.6 - 14.5 %    PLATELET 591 808 - 011 K/uL    MPV 9.6 9.2 - 11.8 FL    NEUTROPHILS 42 40 - 73 %    LYMPHOCYTES 43 21 - 52 %    MONOCYTES 12 (H) 3 - 10 %    EOSINOPHILS 3 0 - 5 %    BASOPHILS 0 0 - 2 %    ABS. NEUTROPHILS 2.9 1.8 - 8.0 K/UL    ABS. LYMPHOCYTES 3.0 0.9 - 3.6 K/UL    ABS. MONOCYTES 0.8 0.05 - 1.2 K/UL    ABS. EOSINOPHILS 0.2 0.0 - 0.4 K/UL    ABS. BASOPHILS 0.0 0.0 - 0.06 K/UL    DF AUTOMATED     NT-PRO BNP    Collection Time: 03/13/18  4:45 PM   Result Value Ref Range    NT pro- 0 - 900 PG/ML       Radiologic Studies -   XR CHEST PA LAT   Final Result            Medical Decision Making   I am the first provider for this patient. I reviewed the vital signs, available nursing notes, past medical history, past surgical history, family history and social history. Vital Signs-Reviewed the patient's vital signs. Pulse Oximetry Analysis -  94 on room air (Interpretation)wnl      Records Reviewed: Nursing Notes and Old Medical Records (Time of Review: 5:01 PM)    ED Course: Progress Notes, Reevaluation, and Consults:      Provider Notes (Medical Decision Making):   DDX; DVT, CHF, lymphedema,    IMPRESSION AND MEDICAL DECISION MAKING:  Based upon the patient's presentation with noted HPI and PE, along with the work up done in the emergency department, I believe that the patient has some pitting edema. Labs and imaging are reassuring. Will have her elevate her legs at night and follow up with her PCP. PROGRESS NOTE: One or more blood pressure readings were noted elevated during the Pt's presentation in the emergency department this date.  This abnormal reading has been cited in the Pt's diagnosis, and they have been encouraged to follow up with their primary care physician, or referred to a consultant for further evaluation and treatment. Pt advised of elevated BP. Advised Pt the need for follow up for the elevated BP. Advised Pt to monitor and record BP readings. ACEP guidelines are  Initiating treatment for asymptomatic hypertension in the ED is not necessary when patients have follow-up; (2) Rapidly lowering blood pressure in asymptomatic patients in the ED is unnecessary and may be harmful in some patients; (3) When ED treatment for asymptomatic hypertension is initiated, blood pressure management should attempt to gradually lower blood pressure and should not be expected to be normalized during the initial ED visit. The patient will be discharged home. Warning signs of worsening condition were discussed and understood by the patient. Based on patient's age, coexisting illness, exam, and the results of this ED evaluation, the decision to treat as an outpatient was made. Based on the information available at time of discharge, acute pathology requiring immediate intervention was deemed relative unlikely. While it is impossible to completely exclude the possibility of underlying serious disease or worsening of condition, I feel the relative likelihood is extremely low. I discussed this uncertainty with the patient, who understood ED evaluation and treatment and felt comfortable with the outpatient treatment plan. All questions regarding care, test results, and follow up were answered. The patient is stable and appropriate to discharge. They understand that they should return to the emergency department for any new or worsening symptoms. I stressed the importance of follow up for repeat assessment and possibly further evaluation/treatment. Diagnosis     Clinical Impression:   1. Leg swelling    2.  Elevated blood pressure reading      _______________________________

## 2018-03-13 NOTE — DISCHARGE INSTRUCTIONS
Elevated Blood Pressure: Care Instructions  Your Care Instructions    Blood pressure is a measure of how hard the blood pushes against the walls of your arteries. It's normal for blood pressure to go up and down throughout the day. But if it stays up over time, you have high blood pressure. Two numbers tell you your blood pressure. The first number is the systolic pressure. It shows how hard the blood pushes when your heart is pumping. The second number is the diastolic pressure. It shows how hard the blood pushes between heartbeats, when your heart is relaxed and filling with blood. An ideal blood pressure in adults is less than 120/80 (say \"120 over 80\"). High blood pressure is 140/90 or higher. You have high blood pressure if your top number is 140 or higher or your bottom number is 90 or higher, or both. The main test for high blood pressure is simple, fast, and painless. To diagnose high blood pressure, your doctor will test your blood pressure at different times. After testing your blood pressure, your doctor may ask you to test it again when you are home. If you are diagnosed with high blood pressure, you can work with your doctor to make a long-term plan to manage it. Follow-up care is a key part of your treatment and safety. Be sure to make and go to all appointments, and call your doctor if you are having problems. It's also a good idea to know your test results and keep a list of the medicines you take. How can you care for yourself at home? · Do not smoke. Smoking increases your risk for heart attack and stroke. If you need help quitting, talk to your doctor about stop-smoking programs and medicines. These can increase your chances of quitting for good. · Stay at a healthy weight. · Try to limit how much sodium you eat to less than 2,300 milligrams (mg) a day. Your doctor may ask you to try to eat less than 1,500 mg a day. · Be physically active.  Get at least 30 minutes of exercise on most days of the week. Walking is a good choice. You also may want to do other activities, such as running, swimming, cycling, or playing tennis or team sports. · Avoid or limit alcohol. Talk to your doctor about whether you can drink any alcohol. · Eat plenty of fruits, vegetables, and low-fat dairy products. Eat less saturated and total fats. · Learn how to check your blood pressure at home. When should you call for help? Call your doctor now or seek immediate medical care if:  ? · Your blood pressure is much higher than normal (such as 180/110 or higher). ? · You think high blood pressure is causing symptoms such as:  ¨ Severe headache. ¨ Blurry vision. ? Watch closely for changes in your health, and be sure to contact your doctor if:  ? · You do not get better as expected. Where can you learn more? Go to http://corinne-mynor.info/. Enter E477 in the search box to learn more about \"Elevated Blood Pressure: Care Instructions. \"  Current as of: September 21, 2016  Content Version: 11.4  © 0961-6944 [employer]. Care instructions adapted under license by [employer] (which disclaims liability or warranty for this information). If you have questions about a medical condition or this instruction, always ask your healthcare professional. [address] any warranty or liability for your use of this information.

## 2018-03-13 NOTE — ED TRIAGE NOTES
Patient states lower extremity swelling x 2 weeks. She states prior hx of swelling to legs but not to \"this extent\". Denies shortness of breath.

## 2018-03-14 LAB
ATRIAL RATE: 66 BPM
CALCULATED P AXIS, ECG09: 53 DEGREES
CALCULATED R AXIS, ECG10: 21 DEGREES
CALCULATED T AXIS, ECG11: 34 DEGREES
DIAGNOSIS, 93000: NORMAL
P-R INTERVAL, ECG05: 180 MS
Q-T INTERVAL, ECG07: 414 MS
QRS DURATION, ECG06: 86 MS
QTC CALCULATION (BEZET), ECG08: 434 MS
VENTRICULAR RATE, ECG03: 66 BPM

## 2018-03-26 ENCOUNTER — OFFICE VISIT (OUTPATIENT)
Dept: ORTHOPEDIC SURGERY | Age: 73
End: 2018-03-26

## 2018-03-26 ENCOUNTER — HOSPITAL ENCOUNTER (OUTPATIENT)
Dept: VASCULAR SURGERY | Age: 73
Discharge: HOME | End: 2018-03-26
Attending: NURSE PRACTITIONER
Payer: MEDICARE

## 2018-03-26 VITALS
DIASTOLIC BLOOD PRESSURE: 60 MMHG | SYSTOLIC BLOOD PRESSURE: 148 MMHG | HEIGHT: 63 IN | BODY MASS INDEX: 38.09 KG/M2 | WEIGHT: 215 LBS | HEART RATE: 62 BPM

## 2018-03-26 DIAGNOSIS — M79.89 LEG SWELLING: ICD-10-CM

## 2018-03-26 DIAGNOSIS — M51.36 OTHER INTERVERTEBRAL DISC DEGENERATION, LUMBAR REGION: ICD-10-CM

## 2018-03-26 DIAGNOSIS — M54.16 LUMBAR NEURITIS: Primary | ICD-10-CM

## 2018-03-26 PROCEDURE — 93970 EXTREMITY STUDY: CPT

## 2018-03-26 RX ORDER — DULOXETIN HYDROCHLORIDE 30 MG/1
30 CAPSULE, DELAYED RELEASE ORAL DAILY
Qty: 30 CAP | Refills: 1 | Status: SHIPPED | OUTPATIENT
Start: 2018-03-26 | End: 2018-04-23 | Stop reason: SDUPTHER

## 2018-03-26 RX ORDER — FUROSEMIDE 20 MG/1
20 TABLET ORAL
COMMUNITY
Start: 2018-03-22

## 2018-03-26 NOTE — PROGRESS NOTES
Yessica Pandey [facility] [address]  [address], [address]  [facility], [address]  Phone: [phone]  Fax: [phone]    Clay Sprain  : 1945  PCP: Debbie Carrasco, DO    PROGRESS NOTE    HISTORY OF PRESENT ILLNESS:  Chief Complaint   Patient presents with    Follow-up     lumbar LLE pain terminating at foot   Nga Baldwin is a 67 y.o.  female with history of chronic recurrent low back pain radiating into the LLE x 6 years, progressing x 1 year. No specific injury/trauma. Pt reports limitations with standing and walking tolerance. She has had blocks in the past with relief. Symptoms consistent for stenosis. She is not a candidate for Topamax secondary to her h/o glaucoma. She has a hx of DM. She is not any antidepressants. She currently takes Neurontin 600 mg QAM and 300 mg Q afternoon and QHS. She continues to have left low back pain radiating to her left lower leg down to her calf. She has more good days then bad but does report a decreased walking tolerance due to the leg pain. Today,  She states she is hurting but in her legs. She has had increased swelling. She has been to the ER and her PCP for this. They started on her on Lasix. She sates her legs have always swollen some, but now she can not wear shoes. She states since the increase in Gabapentin, she feels the swelling has greatly increased. She has not had a duplex study. Denies bladder/bowel dysfunction, saddle paresthesia, weakness, gait disturbance, or other neurological deficit. States she has been using Gabapentin with moderate relie. Pt at this time desires to continue with current care/proceed with medication evaluation. ASSESSMENT  68 y.o. female with lumbar pain and bilateral leg swelling. Diagnoses and all orders for this visit:    1. Lumbar neuritis  -     DULoxetine (CYMBALTA) 30 mg capsule; Take 1 Cap by mouth daily. Indications: NEUROPATHIC PAIN    2.  Other intervertebral disc degeneration, lumbar region  -     DULoxetine (CYMBALTA) 30 mg capsule; Take 1 Cap by mouth daily. Indications: NEUROPATHIC PAIN    3. Leg swelling  -     DUPLEX LOWER EXT VENOUS BILAT; Future       IMPRESSION/PLAN    1) Pt was given information on back care. 2) Trial of Cymbalta 30 mg. Taper off Gabapentin due to swelling. 3) Duplex of bilateral LE as this has not been done, calf's painful and warm. 4) Ms. Lesly Castillo has a reminder for a \"due or due soon\" health maintenance. I have asked that she contact her primary care provider, Zoë Gaitan DO, for follow-up on this health maintenance. 5) We have informed patient to notify us for immediate appointment if he has any worsening neurogical symptoms or if an emergency situation presents, then call 911  6) Pt will follow-up in back in 4 weeks. PAST MEDICAL HISTORY  Past Medical History:   Diagnosis Date    Arthritis     Diabetes ([facility] [address]     Hypertension     Renal lesion     Stroke [facility])         MEDICATIONS  Current Outpatient Prescriptions   Medication Sig Dispense Refill    SITagliptin (JANUVIA) 50 mg tablet 50 mg.      furosemide (LASIX) 20 mg tablet 20 mg.      DULoxetine (CYMBALTA) 30 mg capsule Take 1 Cap by mouth daily. Indications: NEUROPATHIC PAIN 30 Cap 1    gabapentin (NEURONTIN) 300 mg capsule Take 2 tabs every morning, 1 tab in the afternoon and 1 tab in the evening. 360 Cap 0    hyoscyamine SL (LEVSIN/SL) 0.125 mg SL tablet 0.125 mg.      cycloSPORINE (RESTASIS) 0.05 % ophthalmic emulsion       Olmesartan-amLODIPine-HCTZ 40-10-25 mg tab       rosuvastatin (CRESTOR) 10 mg tablet       metoprolol succinate (TOPROL-XL) 25 mg XL tablet TAKE 1 TABLET BY MOUTH ONCE A DAY. 3    aspirin 81 mg chewable tablet Take 81 mg by mouth daily.  bimatoprost (LUMIGAN) 0.03 % ophthalmic drops Administer 1 Drop to both eyes every evening.          ALLERGIES  Allergies   Allergen Reactions    Mylanta [Aluminum-Magnesium Hydroxide] Hives SOCIAL HISTORY    Social History     Social History    Marital status:      Spouse name: N/A    Number of children: N/A    Years of education: N/A     Occupational History    Not on file. Social History Main Topics    Smoking status: Former Smoker    Smokeless tobacco: Never Used    Alcohol use 0.0 oz/week     0 Standard drinks or equivalent per week    Drug use: No    Sexual activity: Not on file     Other Topics Concern    Not on file     Social History Narrative       SUBJECTIVE    Pain Scale: 0 - No pain/10    Pain Assessment  3/26/2018   Location of Pain Back;Leg   Location Modifiers Left   Severity of Pain 0   Quality of Pain Aching   Quality of Pain Comment -   Duration of Pain -   Frequency of Pain Intermittent   Aggravating Factors Standing;Walking   Aggravating Factors Comment -   Limiting Behavior Some   Relieving Factors Rest   Relieving Factors Comment -   Result of Injury No       Accompanied by family member. REVIEW OF SYSTEMS  ROS    Constitutional: Negative for fever, chills, or weight change. Respiratory: Negative for cough or shortness of breath. Cardiovascular: Negative for chest pain or palpitations. Gastrointestinal: Negative for acid reflux, change in bowel habits, or constipation. Genitourinary: Negative for incontinence, dysuria and flank pain. Musculoskeletal: Positive for lumbar and bilateral leg pain. Skin: Negative for rash. Neurological: Negative for headaches, dizziness, or numbness. Endo/Heme/Allergies: Negative . Psychiatric/Behavioral: Negative. PHYSICAL EXAMINATION  Visit Vitals    /60    Pulse 62    Ht 5' 3\" (1.6 m)    Wt 215 lb (97.5 kg)    BMI 38.09 kg/m2       Constitutional: Well developed,  well nourished,  awake, alert, and in no acute distress. Neurological:  Sensation to light touch is intact. Psychiatric: Affect and mood are appropriate.    Integumentary: No rashes or abrasions noted on exposed areas,  warm, dry and intact. Cardiovascular/Peripheral Vascular:  No peripheral edema is noted. Lymphatic:  No evidence of lymphedema. No cervical lymphadenopathy. SPINE/MUSCULOSKELETAL EXAM    Lumbar spine:  No rash, ecchymosis, or gross obliquity. No fasciculations. No focal atrophy is noted. Range of motion is intact. No Tenderness to palpation to lumbar. SI joints non-tender. Trochanters non tender. Musculoskeletal:  No pain with extension, axial loading, or forward flexion. No pain with internal or external rotation of her hips. MOTOR     Hip Flex  Quads Hamstrings Ankle DF EHL Ankle PF   Right +4/5 +4/5 +4/5 +4/5 +4/5 +4/5   Left +4/5 +4/5 +4/5 +4/5 +4/5 +4/5   Straight Leg raise negative bilaterally. normal gait and station    Ambulation without assistive device. full weight bearing, non-antalgic gait.     Winda Factor, NP

## 2018-03-26 NOTE — PROCEDURES
[facility]  *** FINAL REPORT ***    Name: Hadley Fox  MRN: [id number]    Outpatient  : 1945  HIS Order #: [id number]  [address] [facility] Visit #: [id number]  Date: 26 Mar 2018    TYPE OF TEST: Peripheral Venous Testing    REASON FOR TEST  Pain in limb, Limb swelling    Right Leg:-  Deep venous thrombosis:           No  Superficial venous thrombosis:    No  Deep venous insufficiency:        Not examined  Superficial venous insufficiency: Not examined    Left Leg:-  Deep venous thrombosis:           No  Superficial venous thrombosis:    No  Deep venous insufficiency:        Not examined  Superficial venous insufficiency: Not examined      INTERPRETATION/FINDINGS  Duplex images were obtained using 2-D gray scale, color flow, and  spectral Doppler analysis. Right leg :  1. No evidence of deep venous thrombosis detected in the veins  visualized. 2. Deep vein(s) visualized include the common femoral, femoral,  popliteal, posterior tibial, and peroneal veins. 3. No evidence of superficial thrombosis detected. 4. Superficial vein(s) visualized include the great saphenous vein at  the sapheno-femoral junction. Left leg :  1. No evidence of deep venous thrombosis detected in the veins  visualized. 2. Deep vein(s) visualized include the common femoral, femoral,  popliteal, posterior tibial, and peroneal veins. 3. No evidence of superficial thrombosis detected. 4. Superficial vein(s) visualized include the great saphenous vein at  the sapheno-femoral junction. ADDITIONAL COMMENTS    I have personally reviewed the data relevant to the interpretation of  this  study. TECHNOLOGIST: Baljeet Lentz RVT  Signed: 2018 02:49 PM    PHYSICIAN: Jeannine Alonso MD  Signed: 2018 08:50 AM

## 2018-03-26 NOTE — PROGRESS NOTES
Bilateral lower extremity venous duplex exam complete; preliminary findings to follow. Armband removed and patient discharged.

## 2018-03-28 ENCOUNTER — TELEPHONE (OUTPATIENT)
Dept: ORTHOPEDIC SURGERY | Age: 73
End: 2018-03-28

## 2018-04-23 ENCOUNTER — OFFICE VISIT (OUTPATIENT)
Dept: ORTHOPEDIC SURGERY | Age: 73
End: 2018-04-23

## 2018-04-23 VITALS
BODY MASS INDEX: 37.21 KG/M2 | HEART RATE: 58 BPM | HEIGHT: 63 IN | WEIGHT: 210 LBS | SYSTOLIC BLOOD PRESSURE: 168 MMHG | DIASTOLIC BLOOD PRESSURE: 75 MMHG

## 2018-04-23 DIAGNOSIS — M51.36 OTHER INTERVERTEBRAL DISC DEGENERATION, LUMBAR REGION: ICD-10-CM

## 2018-04-23 DIAGNOSIS — M54.16 LUMBAR NEURITIS: ICD-10-CM

## 2018-04-23 RX ORDER — DULOXETIN HYDROCHLORIDE 30 MG/1
30 CAPSULE, DELAYED RELEASE ORAL DAILY
Qty: 90 CAP | Refills: 0 | Status: SHIPPED | OUTPATIENT
Start: 2018-04-23 | End: 2018-07-23 | Stop reason: SDUPTHER

## 2018-04-23 NOTE — PROGRESS NOTES
Yessica Pandey [facility] [address].  [address], [address]  [place], [address] [place] [address] Street  Phone: [phone]  Fax: [phone]    Mildred Evans  : 1945  PCP: Toby Robertson DO    PROGRESS NOTE    HISTORY OF PRESENT ILLNESS:  Chief Complaint   Patient presents with    Follow-up     lumbar LLE pain. Jose Armando Hollis a 67 y. o.  female with history of chronic recurrent low back pain radiating into the LLE x 6 years, progressing x 1 year. No specific injury/trauma. Pt reports limitations with standing and walking tolerance. She has had blocks in the past with relief.  Symptoms consistent for stenosis. She is not a candidate for Topamax secondary to her h/o glaucoma. She has a hx of DM. She is not any antidepressants. She currently takes Neurontin 600 mg QAM and 300 mg Q afternoon and QHS. She continues to have left low back pain radiating to her left lower leg down to her calf.  She has more good days then bad but does report a decreased walking tolerance due to the leg pain. At her last visit, she had been started on Lasix by her PCP for leg swelling. Duplex study negative for blood clots. We started her on Cymbalta 30 mg and stopped gabapentin. Today, she states she is doing great. The Cymbalta is working great. She has no pain most of the time. She will have mild pain with prolonged standing. She may have some pain after 15 mins of walking. Her back pain and leg pain are controlled on this dose. Denies bladder/bowel dysfunction, saddle paresthesia, weakness, gait disturbance, or other neurological deficit. States she has been using Gabapentin with moderate relie. Pt at this time desires to continue with current care/proceed with medication evaluation. ASSESSMENT  68 y.o. female with a history of lumbar pain. Diagnoses and all orders for this visit:    1. Lumbar neuritis  -     DULoxetine (CYMBALTA) 30 mg capsule; Take 1 Cap by mouth daily.  Indications: NEUROPATHIC PAIN    2. Other intervertebral disc degeneration, lumbar region  -     DULoxetine (CYMBALTA) 30 mg capsule; Take 1 Cap by mouth daily. Indications: NEUROPATHIC PAIN       IMPRESSION/PLAN    1) Pt was given information on back care. 2) Continue Cymbalta 30 mg.   3) Ms. Mike Joya has a reminder for a \"due or due soon\" health maintenance. I have asked that she contact her primary care provider, Abhishek Skinner DO, for follow-up on this health maintenance. 4) We have informed patient to notify us for immediate appointment if he has any worsening neurogical symptoms or if an emergency situation presents, then call 911  5) Pt will follow-up in 3 months for med fu. PAST MEDICAL HISTORY  Past Medical History:   Diagnosis Date    Arthritis     Diabetes ([facility] [address]     Hypertension     Renal lesion     Stroke [facility])         MEDICATIONS  Current Outpatient Prescriptions   Medication Sig Dispense Refill    DULoxetine (CYMBALTA) 30 mg capsule Take 1 Cap by mouth daily. Indications: NEUROPATHIC PAIN 90 Cap 0    SITagliptin (JANUVIA) 50 mg tablet 50 mg.      furosemide (LASIX) 20 mg tablet 20 mg.      hyoscyamine SL (LEVSIN/SL) 0.125 mg SL tablet 0.125 mg.      cycloSPORINE (RESTASIS) 0.05 % ophthalmic emulsion       Olmesartan-amLODIPine-HCTZ 40-10-25 mg tab       rosuvastatin (CRESTOR) 10 mg tablet       metoprolol succinate (TOPROL-XL) 25 mg XL tablet TAKE 1 TABLET BY MOUTH ONCE A DAY. 3    aspirin 81 mg chewable tablet Take 81 mg by mouth daily.  bimatoprost (LUMIGAN) 0.03 % ophthalmic drops Administer 1 Drop to both eyes every evening. ALLERGIES  Allergies   Allergen Reactions    Mylanta [Aluminum-Magnesium Hydroxide] Hives       SOCIAL HISTORY    Social History     Social History    Marital status:      Spouse name: N/A    Number of children: N/A    Years of education: N/A     Occupational History    Not on file.      Social History Main Topics    Smoking status: Former Smoker  Smokeless tobacco: Never Used    Alcohol use 0.0 oz/week     0 Standard drinks or equivalent per week    Drug use: No    Sexual activity: Not on file     Other Topics Concern    Not on file     Social History Narrative       SUBJECTIVE        Pain Scale: 0 - No pain/10    Pain Assessment  4/23/2018   Location of Pain Back;Leg   Location Modifiers Left   Severity of Pain 0   Quality of Pain -   Quality of Pain Comment -   Duration of Pain -   Frequency of Pain -   Aggravating Factors -   Aggravating Factors Comment -   Limiting Behavior -   Relieving Factors -   Relieving Factors Comment -   Result of Injury -       Accompanied by self. REVIEW OF SYSTEMS  ROS    Constitutional: Negative for fever, chills, or weight change. Respiratory: Negative for cough or shortness of breath. Cardiovascular: Negative for chest pain or palpitations. Gastrointestinal: Negative for acid reflux, change in bowel habits, or constipation. Genitourinary: Negative for incontinence, dysuria and flank pain. Musculoskeletal: Negative for lumbar pain. Skin: Negative for rash. Neurological: Negative for headaches, dizziness, or numbness. Endo/Heme/Allergies: Negative . Psychiatric/Behavioral: Negative. PHYSICAL EXAMINATION  Visit Vitals    /75    Pulse (!) 58    Ht 5' 3\" (1.6 m)    Wt 210 lb (95.3 kg)    BMI 37.2 kg/m2       Constitutional: Well developed,  well nourished,  awake, alert, and in no acute distress. Neurological:  Sensation to light touch is intact. Psychiatric: Affect and mood are appropriate. Integumentary: No rashes or abrasions noted on exposed areas,  warm, dry and intact. Cardiovascular/Peripheral Vascular:  No peripheral edema is noted. Lymphatic:  No evidence of lymphedema. No cervical lymphadenopathy. SPINE/MUSCULOSKELETAL EXAM      Lumbar spine:  No rash, ecchymosis, or gross obliquity. No fasciculations. No focal atrophy is noted. Range of motion is intact.  No Tenderness to palpation . SI joints non-tender. Trochanters non tender. Musculoskeletal:  No pain with extension, axial loading, or forward flexion. No pain with internal or external rotation of her hips. MOTOR     Hip Flex  Quads Hamstrings Ankle DF EHL Ankle PF   Right +4/5 +4/5 +4/5 +4/5 +4/5 +4/5   Left +4/5 +4/5 +4/5 +4/5 +4/5 +4/5       Straight Leg raise negative bilaterally. normal gait and station    Ambulation without assistive device. full weight bearing, non-antalgic gait.     Sonam Foy, FABIAN

## 2018-04-23 NOTE — PATIENT INSTRUCTIONS
Learning About How to Have a Healthy Back  What causes back pain? Back pain is often caused by overuse, strain, or injury. For example, people often hurt their backs playing sports or working in the yard, being jolted in a car accident, or lifting something too heavy. Aging plays a part too. Your bones and muscles tend to lose strength as you age, which makes injury more likely. The spongy discs between the bones of the spine (vertebrae) may suffer from wear and tear and no longer provide enough cushion between the bones. A disc that bulges or breaks open (herniated disc) can press on nerves, causing back pain. In some people, back pain is the result of arthritis, broken vertebrae caused by bone loss (osteoporosis), illness, or a spine problem. Although most people have back pain at one time or another, there are steps you can take to make it less likely. How can you have a healthy back? Reduce stress on your back through good posture  Slumping or slouching alone may not cause low back pain. But after the back has been strained or injured, bad posture can make pain worse. · Sleep in a position that maintains your back's normal curves and on a mattress that feels comfortable. Sleep on your side with a pillow between your knees, or sleep on your back with a pillow under your knees. These positions can reduce strain on your back. · Stand and sit up straight. \"Good posture\" generally means your ears, shoulders, and hips are in a straight line. · If you must stand for a long time, put one foot on a stool, ledge, or box. Switch feet every now and then. · Sit in a chair that is low enough to let you place both feet flat on the floor with both knees nearly level with your hips. If your chair or desk is too high, use a footrest to raise your knees. Place a small pillow, a rolled-up towel, or a lumbar roll in the curve of your back if you need extra support.   · Try a kneeling chair, which helps tilt your hips forward. This takes pressure off your lower back. · Try sitting on an exercise ball. It can rock from side to side, which helps keep your back loose. · When driving, keep your knees nearly level with your hips. Sit straight, and drive with both hands on the steering wheel. Your arms should be in a slightly bent position. Reduce stress on your back through careful lifting  · Squat down, bending at the hips and knees only. If you need to, put one knee to the floor and extend your other knee in front of you, bent at a right angle (half kneeling). · Press your chest straight forward. This helps keep your upper back straight while keeping a slight arch in your low back. · Hold the load as close to your body as possible, at the level of your belly button (navel). · Use your feet to change direction, taking small steps. · Lead with your hips as you change direction. Keep your shoulders in line with your hips as you move. · Set down your load carefully, squatting with your knees and hips only. Exercise and stretch your back  · Do some exercise on most days of the week, if your doctor says it is okay. You can walk, run, swim, or cycle. · Stretch your back muscles. Here are a few exercises to try:  [place] on your back, and gently pull one bent knee to your chest. Put that foot back on the floor, and then pull the other knee to your chest.  ¨ Do pelvic tilts. Lie on your back with your knees bent. Tighten your stomach muscles. Pull your belly button (navel) in and up toward your ribs. You should feel like your back is pressing to the floor and your hips and pelvis are slightly lifting off the floor. Hold for 6 seconds while breathing smoothly. ¨ Sit with your back flat against a wall. · Keep your core muscles strong. The muscles of your back, belly (abdomen), and buttocks support your spine. ¨ Pull in your belly and imagine pulling your navel toward your spine. Hold this for 6 seconds, then relax.  Remember to keep breathing normally as you tense your muscles. ¨ Do curl-ups. Always do them with your knees bent. Keep your low back on the floor, and curl your shoulders toward your knees using a smooth, slow motion. Keep your arms folded across your chest. If this bothers your neck, try putting your hands behind your neck (not your head), with your elbows spread apart. ¨ Lie on your back with your knees bent and your feet flat on the floor. Tighten your belly muscles, and then push with your feet and raise your buttocks up a few inches. Hold this position 6 seconds as you continue to breathe normally, then lower yourself slowly to the floor. Repeat 8 to 12 times. ¨ If you like group exercise, try Pilates or yoga. These classes have poses that strengthen the core muscles. Lead a healthy lifestyle  · Stay at a healthy weight to avoid strain on your back. · Do not smoke. Smoking increases the risk of osteoporosis, which weakens the spine. If you need help quitting, talk to your doctor about stop-smoking programs and medicines. These can increase your chances of quitting for good. Where can you learn more? Go to http://corinne-mynor.info/. Enter L315 in the search box to learn more about \"Learning About How to Have a Healthy Back. \"  Current as of: March 21, 2017  Content Version: 11.4  © 4433-7821 [employer]. Care instructions adapted under license by [employer] (which disclaims liability or warranty for this information). If you have questions about a medical condition or this instruction, always ask your healthcare professional. [place] [address] any warranty or liability for your use of this information.

## 2018-07-23 ENCOUNTER — OFFICE VISIT (OUTPATIENT)
Dept: ORTHOPEDIC SURGERY | Age: 73
End: 2018-07-23

## 2018-07-23 VITALS
SYSTOLIC BLOOD PRESSURE: 176 MMHG | RESPIRATION RATE: 16 BRPM | BODY MASS INDEX: 38.02 KG/M2 | HEART RATE: 81 BPM | DIASTOLIC BLOOD PRESSURE: 68 MMHG | OXYGEN SATURATION: 97 % | HEIGHT: 63 IN | WEIGHT: 214.6 LBS

## 2018-07-23 DIAGNOSIS — M51.36 OTHER INTERVERTEBRAL DISC DEGENERATION, LUMBAR REGION: ICD-10-CM

## 2018-07-23 DIAGNOSIS — M54.16 LUMBAR NEURITIS: ICD-10-CM

## 2018-07-23 PROBLEM — E66.01 SEVERE OBESITY (BMI 35.0-39.9): Status: ACTIVE | Noted: 2018-07-23

## 2018-07-23 RX ORDER — DULOXETIN HYDROCHLORIDE 30 MG/1
30 CAPSULE, DELAYED RELEASE ORAL DAILY
Qty: 90 CAP | Refills: 1 | Status: SHIPPED | OUTPATIENT
Start: 2018-07-23 | End: 2019-02-01 | Stop reason: SDUPTHER

## 2018-07-23 NOTE — PROGRESS NOTES
Yessica Pandey [facility] [address].  [address], [address]  [place], [address] [place] [address] Street  Phone: [phone]  Fax: [phone]    Camelia Davenport  : 1945  PCP: Caro Frias DO    PROGRESS NOTE    HISTORY OF PRESENT ILLNESS:  Chief Complaint   Patient presents with    Back Pain     F/U      Signa Sharper a 67 y. o.  female with history of chronic recurrent low back pain radiating into the LLE x 6 years, progressing x 1 year. No specific injury/trauma. Pt reports limitations with standing and walking tolerance. She has had blocks in the past with relief.  Symptoms consistent for stenosis. She is not a candidate for Topamax secondary to her h/o glaucoma. She has a hx of DM.  She is not any antidepressants.  She currently takes Neurontin 600 mg QAM and 300 mg Q afternoon and QHS. She continues to have left low back pain radiating to her left lower leg down to her calf.  She has more good days then bad but does report a decreased walking tolerance due to the leg pain. At her last visit, she had been started on Lasix by her PCP for leg swelling. Duplex study negative for blood clots. We started her on Cymbalta 30 mg and stopped gabapentin. At her last visit, she was doing great the Cymbalta was working well. She had mild pain with prolonged istting and 15 minutes of walking. Today, she is doing great. Her back feels good. She is in no pain and denies leg pain/  Denies bladder/bowel dysfunction, saddle paresthesia, weakness, gait disturbance, or other neurological deficit. Pt at this time desires to continue with current care/proceed with medication evaluation. ASSESSMENT  68 y.o. female with a hx of lumbar pain. Diagnoses and all orders for this visit:    1. Lumbar neuritis  -     DULoxetine (CYMBALTA) 30 mg capsule; Take 1 Cap by mouth daily. Indications: NEUROPATHIC PAIN    2.  Other intervertebral disc degeneration, lumbar region  -     DULoxetine (CYMBALTA) 30 mg capsule; Take 1 Cap by mouth daily. Indications: NEUROPATHIC PAIN         IMPRESSION/PLAN    1) Pt was given information on back care. 2) Continue Cymbalta 30 mg daily  3) Released from speciality care. Further refills from PCP. 4) Ms. Princess Dang has a reminder for a \"due or due soon\" health maintenance. I have asked that she contact her primary care provider, Cecy Almonte DO, for follow-up on this health maintenance. 5) We have informed patient to notify us for immediate appointment if he has any worsening neurogical symptoms or if an emergency situation presents, then call 911  6) Pt will follow-up in PRN. PAST MEDICAL HISTORY  Past Medical History:   Diagnosis Date    Arthritis     Diabetes ([facility] [address]     Hypertension     Renal lesion     Stroke [facility])         MEDICATIONS  Current Outpatient Prescriptions   Medication Sig Dispense Refill    DULoxetine (CYMBALTA) 30 mg capsule Take 1 Cap by mouth daily. Indications: NEUROPATHIC PAIN 90 Cap 1    SITagliptin (JANUVIA) 50 mg tablet 50 mg.      furosemide (LASIX) 20 mg tablet 20 mg.      hyoscyamine SL (LEVSIN/SL) 0.125 mg SL tablet 0.125 mg.      cycloSPORINE (RESTASIS) 0.05 % ophthalmic emulsion       Olmesartan-amLODIPine-HCTZ 40-10-25 mg tab       rosuvastatin (CRESTOR) 10 mg tablet       metoprolol succinate (TOPROL-XL) 25 mg XL tablet TAKE 1 TABLET BY MOUTH ONCE A DAY. 3    aspirin 81 mg chewable tablet Take 81 mg by mouth daily.  bimatoprost (LUMIGAN) 0.03 % ophthalmic drops Administer 1 Drop to both eyes every evening. ALLERGIES  Allergies   Allergen Reactions    Mylanta [Aluminum-Magnesium Hydroxide] Hives       SOCIAL HISTORY    Social History     Social History    Marital status:      Spouse name: N/A    Number of children: N/A    Years of education: N/A     Occupational History    Not on file.      Social History Main Topics    Smoking status: Former Smoker    Smokeless tobacco: Never Used    Alcohol use 0.0 oz/week     0 Standard drinks or equivalent per week    Drug use: No    Sexual activity: Not on file     Other Topics Concern    Not on file     Social History Narrative       SUBJECTIVE        Pain Scale: 0 - No pain/10    Pain Assessment  7/23/2018   Location of Pain Back   Location Modifiers -   Severity of Pain 0   Quality of Pain -   Quality of Pain Comment -   Duration of Pain -   Frequency of Pain -   Aggravating Factors -   Aggravating Factors Comment -   Limiting Behavior -   Relieving Factors -   Relieving Factors Comment -   Result of Injury -       Accompanied by self. REVIEW OF SYSTEMS  ROS    Constitutional: Negative for fever, chills, or weight change. Respiratory: Negative for cough or shortness of breath. Cardiovascular: Negative for chest pain or palpitations. Gastrointestinal: Negative for acid reflux, change in bowel habits, or constipation. Genitourinary: Negative for incontinence, dysuria and flank pain. Musculoskeletal: Negative for pain. Skin: Negative for rash. Neurological: Negative for headaches, dizziness, or numbness. Endo/Heme/Allergies: Negative . Psychiatric/Behavioral: Negative. PHYSICAL EXAMINATION  Visit Vitals    /68    Pulse 81    Resp 16    Ht 5' 3\" (1.6 m)    Wt 214 lb 9.6 oz (97.3 kg)    SpO2 97%    BMI 38.01 kg/m2       Constitutional: Well developed,  well nourished,  awake, alert, and in no acute distress. Neurological:  Sensation to light touch is intact. Psychiatric: Affect and mood are appropriate. Integumentary: No rashes or abrasions noted on exposed areas,  warm, dry and intact. Cardiovascular/Peripheral Vascular:  No peripheral edema is noted. Lymphatic:  No evidence of lymphedema. No cervical lymphadenopathy. SPINE/MUSCULOSKELETAL EXAM      Lumbar spine:  No rash, ecchymosis, or gross obliquity. No fasciculations. No focal atrophy is noted. Range of motion is intact. No Tenderness to palpation . SI joints non-tender. Trochanters non tender. Musculoskeletal:  No pain with extension, axial loading, or forward flexion. No pain with internal or external rotation of her hips. MOTOR     Hip Flex  Quads Hamstrings Ankle DF EHL Ankle PF   Right +4/5 +4/5 +4/5 +4/5 +4/5 +4/5   Left +4/5 +4/5 +4/5 +4/5 +4/5 +4/5       Straight Leg raise negative bilaterally. normal gait and station    Ambulation with single point cane. full weight bearing, non-antalgic gait.     Michael Foote NP

## 2018-07-23 NOTE — PATIENT INSTRUCTIONS
Learning About How to Have a Healthy Back  What causes back pain? Back pain is often caused by overuse, strain, or injury. For example, people often hurt their backs playing sports or working in the yard, being jolted in a car accident, or lifting something too heavy. Aging plays a part too. Your bones and muscles tend to lose strength as you age, which makes injury more likely. The spongy discs between the bones of the spine (vertebrae) may suffer from wear and tear and no longer provide enough cushion between the bones. A disc that bulges or breaks open (herniated disc) can press on nerves, causing back pain. In some people, back pain is the result of arthritis, broken vertebrae caused by bone loss (osteoporosis), illness, or a spine problem. Although most people have back pain at one time or another, there are steps you can take to make it less likely. How can you have a healthy back? Reduce stress on your back through good posture  Slumping or slouching alone may not cause low back pain. But after the back has been strained or injured, bad posture can make pain worse. · Sleep in a position that maintains your back's normal curves and on a mattress that feels comfortable. Sleep on your side with a pillow between your knees, or sleep on your back with a pillow under your knees. These positions can reduce strain on your back. · Stand and sit up straight. \"Good posture\" generally means your ears, shoulders, and hips are in a straight line. · If you must stand for a long time, put one foot on a stool, ledge, or box. Switch feet every now and then. · Sit in a chair that is low enough to let you place both feet flat on the floor with both knees nearly level with your hips. If your chair or desk is too high, use a footrest to raise your knees. Place a small pillow, a rolled-up towel, or a lumbar roll in the curve of your back if you need extra support.   · Try a kneeling chair, which helps tilt your hips forward. This takes pressure off your lower back. · Try sitting on an exercise ball. It can rock from side to side, which helps keep your back loose. · When driving, keep your knees nearly level with your hips. Sit straight, and drive with both hands on the steering wheel. Your arms should be in a slightly bent position. Reduce stress on your back through careful lifting  · Squat down, bending at the hips and knees only. If you need to, put one knee to the floor and extend your other knee in front of you, bent at a right angle (half kneeling). · Press your chest straight forward. This helps keep your upper back straight while keeping a slight arch in your low back. · Hold the load as close to your body as possible, at the level of your belly button (navel). · Use your feet to change direction, taking small steps. · Lead with your hips as you change direction. Keep your shoulders in line with your hips as you move. · Set down your load carefully, squatting with your knees and hips only. Exercise and stretch your back  · Do some exercise on most days of the week, if your doctor says it is okay. You can walk, run, swim, or cycle. · Stretch your back muscles. Here are a few exercises to try:  Serina Sauce on your back, and gently pull one bent knee to your chest. Put that foot back on the floor, and then pull the other knee to your chest.  ¨ Do pelvic tilts. Lie on your back with your knees bent. Tighten your stomach muscles. Pull your belly button (navel) in and up toward your ribs. You should feel like your back is pressing to the floor and your hips and pelvis are slightly lifting off the floor. Hold for 6 seconds while breathing smoothly. ¨ Sit with your back flat against a wall. · Keep your core muscles strong. The muscles of your back, belly (abdomen), and buttocks support your spine. ¨ Pull in your belly and imagine pulling your navel toward your spine. Hold this for 6 seconds, then relax.  Remember to keep breathing normally as you tense your muscles. ¨ Do curl-ups. Always do them with your knees bent. Keep your low back on the floor, and curl your shoulders toward your knees using a smooth, slow motion. Keep your arms folded across your chest. If this bothers your neck, try putting your hands behind your neck (not your head), with your elbows spread apart. ¨ Lie on your back with your knees bent and your feet flat on the floor. Tighten your belly muscles, and then push with your feet and raise your buttocks up a few inches. Hold this position 6 seconds as you continue to breathe normally, then lower yourself slowly to the floor. Repeat 8 to 12 times. ¨ If you like group exercise, try Pilates or yoga. These classes have poses that strengthen the core muscles. Lead a healthy lifestyle  · Stay at a healthy weight to avoid strain on your back. · Do not smoke. Smoking increases the risk of osteoporosis, which weakens the spine. If you need help quitting, talk to your doctor about stop-smoking programs and medicines. These can increase your chances of quitting for good. Where can you learn more? Go to http://corinne-mynor.info/. Enter L315 in the search box to learn more about \"Learning About How to Have a Healthy Back. \"  Current as of: November 29, 2017  Content Version: 11.7  © 1418-8928 [employer]. Care instructions adapted under license by [employer] (which disclaims liability or warranty for this information). If you have questions about a medical condition or this instruction, always ask your healthcare professional. [place] [address] any warranty or liability for your use of this information.

## 2019-01-22 DIAGNOSIS — M51.36 OTHER INTERVERTEBRAL DISC DEGENERATION, LUMBAR REGION: ICD-10-CM

## 2019-01-22 DIAGNOSIS — M54.16 LUMBAR NEURITIS: ICD-10-CM

## 2019-01-22 RX ORDER — DULOXETIN HYDROCHLORIDE 30 MG/1
30 CAPSULE, DELAYED RELEASE ORAL DAILY
Qty: 90 CAP | Refills: 1 | OUTPATIENT
Start: 2019-01-22

## 2019-01-22 NOTE — TELEPHONE ENCOUNTER
Last Visit: 7/23  Next Appt: RTC if symptoms worsen  Previous Refill Encounter: 7/23-90+1    Requested Prescriptions     Pending Prescriptions Disp Refills    DULoxetine (CYMBALTA) 30 mg capsule 90 Cap 1     Sig: Take 1 Cap by mouth daily.

## 2019-01-31 NOTE — TELEPHONE ENCOUNTER
Left a message on an unidentified voicemail requesting a return call to the office. The patient has been released from speciality care. Further refills should be obtained through PCP. Refused       Disp Refills Start End   DULoxetine (CYMBALTA) 30 mg capsule 90 Cap 1 1/22/2019    Sig - Route: Take 1 Cap by mouth daily. - Oral   Class:  Normal   NORMA:  No   Reason for Refusal:  other   Reason for Refusal Comment:  Released from speciality care. Further refills from PCP.    Refused By:  Santosh Granados NP

## 2019-02-01 RX ORDER — DULOXETIN HYDROCHLORIDE 30 MG/1
30 CAPSULE, DELAYED RELEASE ORAL DAILY
Qty: 90 CAP | Refills: 0 | Status: SHIPPED | OUTPATIENT
Start: 2019-02-01

## 2019-02-01 NOTE — TELEPHONE ENCOUNTER
Called son back James (on [phone]) and informed of message below from Rosita Heart NP \"Patient was released form specialty care and was to have further refills from PCP. \" Son stated he will PCP, but patient does have an appt with Dr. Willy Whittaker on 02/27/2019 which is the doctor she normally sees.

## 2019-02-01 NOTE — TELEPHONE ENCOUNTER
Patients son Bev Lobato. called in requesting a status check on patients refill. He states that she is now completely out of medication. Bev Lobato can be advised at [phone].

## 2019-02-04 NOTE — TELEPHONE ENCOUNTER
-per message below, called patient and left message via voicemail, informing patient that the prescription was called to the pharmacy and if there were any questions to call [phone].

## 2020-05-09 ENCOUNTER — APPOINTMENT (OUTPATIENT)
Dept: GENERAL RADIOLOGY | Age: 75
End: 2020-05-09
Attending: EMERGENCY MEDICINE
Payer: MEDICARE

## 2020-05-09 ENCOUNTER — HOSPITAL ENCOUNTER (EMERGENCY)
Age: 75
Discharge: HOME | End: 2020-05-09
Attending: EMERGENCY MEDICINE
Payer: MEDICARE

## 2020-05-09 VITALS
BODY MASS INDEX: 36.85 KG/M2 | HEART RATE: 65 BPM | HEIGHT: 61 IN | WEIGHT: 195.2 LBS | OXYGEN SATURATION: 99 % | SYSTOLIC BLOOD PRESSURE: 164 MMHG | RESPIRATION RATE: 16 BRPM | DIASTOLIC BLOOD PRESSURE: 43 MMHG | TEMPERATURE: 98 F

## 2020-05-09 DIAGNOSIS — R55 SYNCOPE AND COLLAPSE: Primary | ICD-10-CM

## 2020-05-09 LAB
ALBUMIN SERPL-MCNC: 2.7 G/DL (ref 3.4–5)
ALBUMIN/GLOB SERPL: 0.7 {RATIO} (ref 0.8–1.7)
ALP SERPL-CCNC: 93 U/L (ref 45–117)
ALT SERPL-CCNC: 15 U/L (ref 13–56)
ANION GAP SERPL CALC-SCNC: 2 MMOL/L (ref 3–18)
APPEARANCE UR: CLEAR
AST SERPL-CCNC: 15 U/L (ref 10–38)
BASOPHILS # BLD: 0 K/UL (ref 0–0.1)
BASOPHILS NFR BLD: 0 % (ref 0–2)
BILIRUB SERPL-MCNC: 0.2 MG/DL (ref 0.2–1)
BILIRUB UR QL: NEGATIVE
BUN SERPL-MCNC: 16 MG/DL (ref 7–18)
BUN/CREAT SERPL: 13 (ref 12–20)
CALCIUM SERPL-MCNC: 8.2 MG/DL (ref 8.5–10.1)
CHLORIDE SERPL-SCNC: 109 MMOL/L (ref 100–111)
CO2 SERPL-SCNC: 31 MMOL/L (ref 21–32)
COLOR UR: YELLOW
CREAT SERPL-MCNC: 1.27 MG/DL (ref 0.6–1.3)
DIFFERENTIAL METHOD BLD: ABNORMAL
EOSINOPHIL # BLD: 0.2 K/UL (ref 0–0.4)
EOSINOPHIL NFR BLD: 3 % (ref 0–5)
ERYTHROCYTE [DISTWIDTH] IN BLOOD BY AUTOMATED COUNT: 17.4 % (ref 11.6–14.5)
GLOBULIN SER CALC-MCNC: 3.8 G/DL (ref 2–4)
GLUCOSE SERPL-MCNC: 86 MG/DL (ref 74–99)
GLUCOSE UR STRIP.AUTO-MCNC: NEGATIVE MG/DL
HCT VFR BLD AUTO: 37.6 % (ref 35–45)
HGB BLD-MCNC: 12.1 G/DL (ref 12–16)
HGB UR QL STRIP: NEGATIVE
KETONES UR QL STRIP.AUTO: NEGATIVE MG/DL
LEUKOCYTE ESTERASE UR QL STRIP.AUTO: NEGATIVE
LYMPHOCYTES # BLD: 2.8 K/UL (ref 0.9–3.6)
LYMPHOCYTES NFR BLD: 35 % (ref 21–52)
MAGNESIUM SERPL-MCNC: 2.2 MG/DL (ref 1.6–2.6)
MCH RBC QN AUTO: 27.4 PG (ref 24–34)
MCHC RBC AUTO-ENTMCNC: 32.2 G/DL (ref 31–37)
MCV RBC AUTO: 85.1 FL (ref 74–97)
MONOCYTES # BLD: 1 K/UL (ref 0.05–1.2)
MONOCYTES NFR BLD: 12 % (ref 3–10)
NEUTS SEG # BLD: 4.1 K/UL (ref 1.8–8)
NEUTS SEG NFR BLD: 50 % (ref 40–73)
NITRITE UR QL STRIP.AUTO: NEGATIVE
PH UR STRIP: 5 [PH] (ref 5–8)
PLATELET # BLD AUTO: 283 K/UL (ref 135–420)
PMV BLD AUTO: 8.8 FL (ref 9.2–11.8)
POTASSIUM SERPL-SCNC: 3.5 MMOL/L (ref 3.5–5.5)
PROT SERPL-MCNC: 6.5 G/DL (ref 6.4–8.2)
PROT UR STRIP-MCNC: NEGATIVE MG/DL
RBC # BLD AUTO: 4.42 M/UL (ref 4.2–5.3)
SODIUM SERPL-SCNC: 142 MMOL/L (ref 136–145)
SP GR UR REFRACTOMETRY: 1.02 (ref 1–1.03)
TROPONIN I SERPL-MCNC: <0.02 NG/ML (ref 0–0.04)
TROPONIN I SERPL-MCNC: <0.02 NG/ML (ref 0–0.04)
UROBILINOGEN UR QL STRIP.AUTO: 0.2 EU/DL (ref 0.2–1)
WBC # BLD AUTO: 8.2 K/UL (ref 4.6–13.2)

## 2020-05-09 PROCEDURE — 84484 ASSAY OF TROPONIN QUANT: CPT

## 2020-05-09 PROCEDURE — 81003 URINALYSIS AUTO W/O SCOPE: CPT

## 2020-05-09 PROCEDURE — 96360 HYDRATION IV INFUSION INIT: CPT

## 2020-05-09 PROCEDURE — 80053 COMPREHEN METABOLIC PANEL: CPT

## 2020-05-09 PROCEDURE — 74011250636 HC RX REV CODE- 250/636: Performed by: EMERGENCY MEDICINE

## 2020-05-09 PROCEDURE — 96361 HYDRATE IV INFUSION ADD-ON: CPT

## 2020-05-09 PROCEDURE — 93005 ELECTROCARDIOGRAM TRACING: CPT

## 2020-05-09 PROCEDURE — 99285 EMERGENCY DEPT VISIT HI MDM: CPT

## 2020-05-09 PROCEDURE — 85025 COMPLETE CBC W/AUTO DIFF WBC: CPT

## 2020-05-09 PROCEDURE — 83735 ASSAY OF MAGNESIUM: CPT

## 2020-05-09 PROCEDURE — 71046 X-RAY EXAM CHEST 2 VIEWS: CPT

## 2020-05-09 RX ORDER — SODIUM CHLORIDE 9 MG/ML
125 INJECTION, SOLUTION INTRAVENOUS CONTINUOUS
Status: DISCONTINUED | OUTPATIENT
Start: 2020-05-09 | End: 2020-05-09 | Stop reason: HOSPADM

## 2020-05-09 RX ADMIN — SODIUM CHLORIDE 1000 ML: 900 INJECTION, SOLUTION INTRAVENOUS at 14:54

## 2020-05-09 NOTE — DISCHARGE INSTRUCTIONS
Patient Education        Fainting: Care Instructions  Your Care Instructions    When you faint, or pass out, you lose consciousness for a short time. A brief drop in blood flow to the brain often causes it. When you fall or lie down, more blood flows to your brain and you regain consciousness. Emotional stress, pain, or overheating--especially if you have been standing--can make you faint. In these cases, fainting is usually not serious. But fainting can be a sign of a more serious problem. Your doctor may want you to have more tests to rule out other causes. The treatment you need depends on the reason why you fainted. The doctor has checked you carefully, but problems can develop later. If you notice any problems or new symptoms, get medical treatment right away. Follow-up care is a key part of your treatment and safety. Be sure to make and go to all appointments, and call your doctor if you are having problems. It's also a good idea to know your test results and keep a list of the medicines you take. How can you care for yourself at home? · Drink plenty of fluids to prevent dehydration. If you have kidney, heart, or liver disease and have to limit fluids, talk with your doctor before you increase your fluid intake. When should you call for help? Call 911 anytime you think you may need emergency care. For example, call if:    · You have symptoms of a heart problem. These may include:  ? Chest pain or pressure. ? Severe trouble breathing. ? A fast or irregular heartbeat. ? Lightheadedness or sudden weakness. ? Coughing up pink, foamy mucus. ? Passing out. After you call  911, the  may tell you to chew 1 adult-strength or 2 to 4 low-dose aspirin. Wait for an ambulance. Do not try to drive yourself.     · You have symptoms of a stroke. These may include:  ? Sudden numbness, tingling, weakness, or loss of movement in your face, arm, or leg, especially on only one side of your body.   ? Sudden vision changes. ? Sudden trouble speaking. ? Sudden confusion or trouble understanding simple statements. ? Sudden problems with walking or balance. ? A sudden, severe headache that is different from past headaches.     · You passed out (lost consciousness) again.    Watch closely for changes in your health, and be sure to contact your doctor if:    · You do not get better as expected. Where can you learn more? Go to http://corinne-mynor.info/  Enter A848 in the search box to learn more about \"Fainting: Care Instructions. \"  Current as of: June 26, 2019Content Version: 12.4  © 5422-1135 [employer]. Care instructions adapted under license by [employer] (which disclaims liability or warranty for this information). If you have questions about a medical condition or this instruction, always ask your healthcare professional. [address] any warranty or liability for your use of this information.

## 2020-05-09 NOTE — ED PROVIDER NOTES
EMERGENCY DEPARTMENT HISTORY AND PHYSICAL EXAM    3:19 PM      Date: 5/9/2020  Patient Name: Mauricio Alexandre    History of Presenting Illness     Chief Complaint   Patient presents with    Syncope         History Provided By: Patient  Location/Duration/Severity/Modifying factors   Patient is a 66-year-old female with a history of hypertension, diabetes, stroke, arthritis, the presents emergency department after syncopal event. Patient says she was at home playing Candy crush and started feeling lightheaded while she was sitting down so she got up to go to the bed and on the way she felt the room was closing in and she passed out. Patient denies any headache or trauma or any chest pain prior to the event. Patient says she was unresponsive per reports and now has no complaints. Patient denies any recent weight loss or change her medications. Patient is compliant with all of her medications. Patient that she been doing well otherwise. Patient denies difficulty eating or drinking. Patient denies any cough, congestion, runny nose, or recent sick contacts. Patient is not a smoker or drinker and is retired at this time. PCP: Jeniffer Erickson MD    Current Facility-Administered Medications   Medication Dose Route Frequency Provider Last Rate Last Dose    0.9% sodium chloride infusion  125 mL/hr IntraVENous CONTINUOUS Judge Phu MD         Current Outpatient Medications   Medication Sig Dispense Refill    DULoxetine (CYMBALTA) 30 mg capsule Take 1 Cap by mouth daily. 90 Cap 0    SITagliptin (JANUVIA) 50 mg tablet 50 mg.      furosemide (LASIX) 20 mg tablet 20 mg.      hyoscyamine SL (LEVSIN/SL) 0.125 mg SL tablet 0.125 mg.      cycloSPORINE (RESTASIS) 0.05 % ophthalmic emulsion       Olmesartan-amLODIPine-HCTZ 40-10-25 mg tab       rosuvastatin (CRESTOR) 10 mg tablet       metoprolol succinate (TOPROL-XL) 25 mg XL tablet TAKE 1 TABLET BY MOUTH ONCE A DAY.   3    aspirin 81 mg chewable tablet Take 81 mg by mouth daily.  bimatoprost (LUMIGAN) 0.03 % ophthalmic drops Administer 1 Drop to both eyes every evening. Past History     Past Medical History:  Past Medical History:   Diagnosis Date    Arthritis     Diabetes ([address]     Hypertension     Renal lesion     Stroke [facility])        Past Surgical History:  Past Surgical History:   Procedure Laterality Date    ABDOMEN SURGERY PROC UNLISTED      HX CHOLECYSTECTOMY         Family History:  Family History   Problem Relation Age of Onset   Gloriajean Seeds Hypertension Mother     Hypertension Father     Hypertension Sister     Hypertension Brother        Social History:  Social History     Tobacco Use    Smoking status: Former Smoker    Smokeless tobacco: Never Used   Substance Use Topics    Alcohol use: Yes     Alcohol/week: 0.0 standard drinks    Drug use: No       Allergies: Allergies   Allergen Reactions    Mylanta [Aluminum-Magnesium Hydroxide] Hives         Review of Systems       Review of Systems   Constitutional: Negative for activity change, fatigue and fever. HENT: Negative for congestion and rhinorrhea. Eyes: Negative for visual disturbance. Respiratory: Negative for shortness of breath. Cardiovascular: Negative for chest pain and palpitations. Gastrointestinal: Negative for abdominal pain, diarrhea, nausea and vomiting. Genitourinary: Negative for dysuria and hematuria. Musculoskeletal: Negative for back pain. Skin: Negative for rash. Neurological: Positive for dizziness and syncope. Negative for weakness and light-headedness. All other systems reviewed and are negative. Physical Exam     Visit Vitals  /43   Pulse 65   Temp 98 °F (36.7 °C)   Resp 16   Ht 5' 1\" (1.549 m)   Wt 88.5 kg (195 lb 3.2 oz)   SpO2 99%   BMI 36.88 kg/m²         Physical Exam  Vitals signs and nursing note reviewed. Constitutional:       General: She is not in acute distress. Appearance: She is well-developed. HENT:      Head: Normocephalic and atraumatic. Right Ear: External ear normal.      Left Ear: External ear normal.      Nose: Nose normal.   Eyes:      General: No scleral icterus. Conjunctiva/sclera: Conjunctivae normal.      Pupils: Pupils are equal, round, and reactive to light. Neck:      Musculoskeletal: Normal range of motion and neck supple. Thyroid: No thyromegaly. Vascular: No JVD. Trachea: No tracheal deviation. Cardiovascular:      Rate and Rhythm: Normal rate and regular rhythm. Heart sounds: Normal heart sounds. No murmur. No friction rub. No gallop. Pulmonary:      Effort: Pulmonary effort is normal.      Breath sounds: Normal breath sounds. Chest:      Chest wall: No tenderness. Abdominal:      General: Bowel sounds are normal. There is no distension. Palpations: Abdomen is soft. Tenderness: There is no abdominal tenderness. There is no guarding or rebound. Musculoskeletal: Normal range of motion. General: No tenderness. Lymphadenopathy:      Cervical: No cervical adenopathy. Skin:     General: Skin is warm and dry. Neurological:      Mental Status: She is alert and oriented to person, place, and time. Cranial Nerves: No cranial nerve deficit. Coordination: Coordination normal.      Comments: No sensory loss, Gait normal, Motor 5/5  No arm or leg drift   Psychiatric:         Behavior: Behavior normal.         Thought Content:  Thought content normal.         Judgment: Judgment normal.           Diagnostic Study Results     Labs -  Recent Results (from the past 12 hour(s))   EKG, 12 LEAD, INITIAL    Collection Time: 05/09/20  2:44 PM   Result Value Ref Range    Ventricular Rate 64 BPM    Atrial Rate 64 BPM    P-R Interval 208 ms    QRS Duration 100 ms    Q-T Interval 434 ms    QTC Calculation (Bezet) 447 ms    Calculated P Axis 47 degrees    Calculated R Axis 24 degrees    Calculated T Axis 24 degrees    Diagnosis       Normal sinus rhythm  Normal ECG  When compared with ECG of 13-MAR-2018 16:43,  premature ventricular complexes are no longer present     CBC WITH AUTOMATED DIFF    Collection Time: 05/09/20  2:50 PM   Result Value Ref Range    WBC 8.2 4.6 - 13.2 K/uL    RBC 4.42 4.20 - 5.30 M/uL    HGB 12.1 12.0 - 16.0 g/dL    HCT 37.6 35.0 - 45.0 %    MCV 85.1 74.0 - 97.0 FL    MCH 27.4 24.0 - 34.0 PG    MCHC 32.2 31.0 - 37.0 g/dL    RDW 17.4 (H) 11.6 - 14.5 %    PLATELET 176 372 - 519 K/uL    MPV 8.8 (L) 9.2 - 11.8 FL    NEUTROPHILS 50 40 - 73 %    LYMPHOCYTES 35 21 - 52 %    MONOCYTES 12 (H) 3 - 10 %    EOSINOPHILS 3 0 - 5 %    BASOPHILS 0 0 - 2 %    ABS. NEUTROPHILS 4.1 1.8 - 8.0 K/UL    ABS. LYMPHOCYTES 2.8 0.9 - 3.6 K/UL    ABS. MONOCYTES 1.0 0.05 - 1.2 K/UL    ABS. EOSINOPHILS 0.2 0.0 - 0.4 K/UL    ABS. BASOPHILS 0.0 0.0 - 0.1 K/UL    DF AUTOMATED     METABOLIC PANEL, COMPREHENSIVE    Collection Time: 05/09/20  2:50 PM   Result Value Ref Range    Sodium 142 136 - 145 mmol/L    Potassium 3.5 3.5 - 5.5 mmol/L    Chloride 109 100 - 111 mmol/L    CO2 31 21 - 32 mmol/L    Anion gap 2 (L) 3.0 - 18 mmol/L    Glucose 86 74 - 99 mg/dL    BUN 16 7.0 - 18 MG/DL    Creatinine 1.27 0.6 - 1.3 MG/DL    BUN/Creatinine ratio 13 12 - 20      GFR est AA 50 (L) >60 ml/min/1.73m2    GFR est non-AA 41 (L) >60 ml/min/1.73m2    Calcium 8.2 (L) 8.5 - 10.1 MG/DL    Bilirubin, total 0.2 0.2 - 1.0 MG/DL    ALT (SGPT) 15 13 - 56 U/L    AST (SGOT) 15 10 - 38 U/L    Alk.  phosphatase 93 45 - 117 U/L    Protein, total 6.5 6.4 - 8.2 g/dL    Albumin 2.7 (L) 3.4 - 5.0 g/dL    Globulin 3.8 2.0 - 4.0 g/dL    A-G Ratio 0.7 (L) 0.8 - 1.7     MAGNESIUM    Collection Time: 05/09/20  2:50 PM   Result Value Ref Range    Magnesium 2.2 1.6 - 2.6 mg/dL   TROPONIN I    Collection Time: 05/09/20  2:50 PM   Result Value Ref Range    Troponin-I, QT <0.02 0.0 - 0.045 NG/ML   URINALYSIS W/ RFLX MICROSCOPIC    Collection Time: 05/09/20  4:24 PM   Result Value Ref Range    Color YELLOW Appearance CLEAR      Specific gravity 1.019 1.005 - 1.030      pH (UA) 5.0 5.0 - 8.0      Protein Negative NEG mg/dL    Glucose Negative NEG mg/dL    Ketone Negative NEG mg/dL    Bilirubin Negative NEG      Blood Negative NEG      Urobilinogen 0.2 0.2 - 1.0 EU/dL    Nitrites Negative NEG      Leukocyte Esterase Negative NEG     TROPONIN I    Collection Time: 05/09/20  5:13 PM   Result Value Ref Range    Troponin-I, QT <0.02 0.0 - 0.045 NG/ML       Radiologic Studies -   XR CHEST PA LAT   Final Result   IMPRESSION:      No acute pulmonary disease. Atelectasis left base, normal cardiac size    Medical Decision Making   I am the first provider for this patient. I reviewed the vital signs, available nursing notes, past medical history, past surgical history, family history and social history. Vital Signs-Reviewed the patient's vital signs. EKG: Normal sinus rhythm at 64, no STEMI, interpreted by me    Records Reviewed: Nursing Notes and Old Medical Records (Time of Review: 3:19 PM)    ED Course: Progress Notes, Reevaluation, and Consults:     Patient's blood pressure and heart rate are reassuring. We will repeat cardiac markers and reevaluate. Tia Patricia, DO 3:55 PM    The patient is alert and oriented ambulating without any distress. At this point she wants to go home I think that is reasonable. Patient is low risk based on the [facility] syncope rule for 30-day serious event and will refer her to a cardiologist in the next week to be evaluated for an echo need for Holter monitoring. We will proceed with close outpatient care and have her return if at all worsened or concerned. Also hold her Toprol for now given the syncopal event and see if that helps prevent future episodes. Workup and recommendations were reviewed with the patient and all questions were answered. The patient understands the plan and will proceed with close outpatient care.   I have encouraged the patient to return if at all worsened or concerned. Darinel Dowling, DO 6:10 PM      Provider Notes (Medical Decision Making):   MDM  Number of Diagnoses or Management Options  Diagnosis management comments: Patient is a 66-year-old female with history of hypertension, diabetes, stroke, arthritis, the presents emergency department after a syncopal event. Patient denies any symptoms at this time and orthostatics are good we will give a liter of IV fluids, follow cardiac labs, and reevaluate. Patient appears to be on multiple blood pressure medications including a beta-blocker. Patient denies following with a cardiologist or any cardiac history. Darinel Dowling, DO 3:21 PM        Procedures         Diagnosis     Clinical Impression:   1. Syncope and collapse        Disposition: DC    Follow-up Information    None          Patient's Medications   Start Taking    No medications on file   Continue Taking    ASPIRIN 81 MG CHEWABLE TABLET    Take 81 mg by mouth daily. BIMATOPROST (LUMIGAN) 0.03 % OPHTHALMIC DROPS    Administer 1 Drop to both eyes every evening. CYCLOSPORINE (RESTASIS) 0.05 % OPHTHALMIC EMULSION        DULOXETINE (CYMBALTA) 30 MG CAPSULE    Take 1 Cap by mouth daily. FUROSEMIDE (LASIX) 20 MG TABLET    20 mg. HYOSCYAMINE SL (LEVSIN/SL) 0.125 MG SL TABLET    0.125 mg. METOPROLOL SUCCINATE (TOPROL-XL) 25 MG XL TABLET    TAKE 1 TABLET BY MOUTH ONCE A DAY. OLMESARTAN-AMLODIPINE-HCTZ 40-10-25 MG TAB        ROSUVASTATIN (CRESTOR) 10 MG TABLET        SITAGLIPTIN (JANUVIA) 50 MG TABLET    50 mg. These Medications have changed    No medications on file   Stop Taking    No medications on file     Disclaimer: Sections of this note are dictated using utilizing voice recognition software. Minor typographical errors may be present. If questions arise, please do not hesitate to contact me or call our department.

## 2020-05-09 NOTE — ED TRIAGE NOTES
Pt to ed from home. Ems reported that pt. Was feeling dizzy when she was sitting in chair playing games. Then when pt. Went to lay down pt. Had a syncopal episode. Pt. Susanna Sung on the ground. When ems arrived pt. Was alert and oriented with no complains.

## 2020-05-10 LAB
ATRIAL RATE: 64 BPM
CALCULATED P AXIS, ECG09: 47 DEGREES
CALCULATED R AXIS, ECG10: 24 DEGREES
CALCULATED T AXIS, ECG11: 24 DEGREES
DIAGNOSIS, 93000: NORMAL
P-R INTERVAL, ECG05: 208 MS
Q-T INTERVAL, ECG07: 434 MS
QRS DURATION, ECG06: 100 MS
QTC CALCULATION (BEZET), ECG08: 447 MS
VENTRICULAR RATE, ECG03: 64 BPM

## 2023-01-23 RX ORDER — CLONIDINE HYDROCHLORIDE 0.1 MG/1
0.1 TABLET ORAL DAILY
COMMUNITY

## 2023-01-23 RX ORDER — CARVEDILOL 12.5 MG/1
12.5 TABLET ORAL 2 TIMES DAILY WITH MEALS
COMMUNITY

## 2023-01-23 RX ORDER — CLOPIDOGREL BISULFATE 75 MG/1
75 TABLET ORAL DAILY
COMMUNITY

## 2023-01-23 RX ORDER — ATORVASTATIN CALCIUM 20 MG/1
20 TABLET, FILM COATED ORAL
COMMUNITY

## 2023-01-23 NOTE — PERIOP NOTES
PRE-SURGICAL INSTRUCTIONS        Patient's Name:  Ciaran Lambert      BHYJOSHUA'O Date:  1/23/2023            Surgery Date:  1/27/2023                Do NOT eat or drink anything, including candy, gum, or ice chips after midnight on 01/27/2023, unless you have specific instructions from your surgeon or anesthesia provider to do so. You may brush your teeth before coming to the hospital.  No smoking 24 hours prior to the day of surgery. No alcohol 24 hours prior to the day of surgery. No recreational drugs for one week prior to the day of surgery. Leave all valuables, including money/purse, at home. Remove all jewelry, nail polish, acrylic nails, and makeup (including mascara); no lotions powders, deodorant, or perfume/cologne/after shave on the skin. Follow instruction for Hibiclens washes and CHG wipes from surgeon's office. Glasses/contact lenses and dentures may be worn to the hospital.  They will be removed prior to surgery. Call your doctor if symptoms of a cold or illness develop within 24-48 hours prior to your surgery. 11.  If you are having an outpatient procedure, please make arrangements for a responsible ADULT TO [address] [place] [address] and stay with you for 24 hours after your surgery. 12. ONE VISITOR in the hospital at this time for outpatient procedures. Exceptions may be made for surgical admissions, per nursing unit guidelines      Special Instructions:      Bring list of CURRENT medications. Bring any pertinent legal medical records. Take these medications the morning of surgery with a sip of water as instructed by office. Follow physician instructions about stopping anticoagulants. Complete bowel prep per MD instructions. On the day of surgery, come in the main entrance of [facility]. Let the  at the desk know you are there for surgery. A staff member will come escort you to the surgical area on the second floor.     If you have any questions or concerns, please do not hesitate to call:     (Prior to the day of surgery) [facility] department:  [phone]   (Day of surgery) Pre-Op department:  [phone]    These surgical instructions were reviewed with Abigail Pena during the [facility] phone call.

## 2023-01-26 ENCOUNTER — ANESTHESIA EVENT (OUTPATIENT)
Dept: ENDOSCOPY | Age: 78
End: 2023-01-26
Payer: MEDICARE

## 2023-01-27 ENCOUNTER — HOSPITAL ENCOUNTER (OUTPATIENT)
Age: 78
Setting detail: OUTPATIENT SURGERY
Discharge: HOME | End: 2023-01-27
Attending: INTERNAL MEDICINE | Admitting: INTERNAL MEDICINE
Payer: MEDICARE

## 2023-01-27 ENCOUNTER — ANESTHESIA (OUTPATIENT)
Dept: ENDOSCOPY | Age: 78
End: 2023-01-27
Payer: MEDICARE

## 2023-01-27 VITALS
WEIGHT: 180 LBS | SYSTOLIC BLOOD PRESSURE: 178 MMHG | HEIGHT: 61 IN | RESPIRATION RATE: 22 BRPM | HEART RATE: 77 BPM | OXYGEN SATURATION: 100 % | BODY MASS INDEX: 33.99 KG/M2 | TEMPERATURE: 98.1 F | DIASTOLIC BLOOD PRESSURE: 64 MMHG

## 2023-01-27 LAB
AMPHET UR QL SCN: NEGATIVE
BARBITURATES UR QL SCN: NEGATIVE
BENZODIAZ UR QL: NEGATIVE
CANNABINOIDS UR QL SCN: POSITIVE
COCAINE UR QL SCN: NEGATIVE
HDSCOM,HDSCOM: ABNORMAL
METHADONE UR QL: NEGATIVE
OPIATES UR QL: NEGATIVE
PCP UR QL: NEGATIVE

## 2023-01-27 PROCEDURE — 2709999900 HC NON-CHARGEABLE SUPPLY: Performed by: INTERNAL MEDICINE

## 2023-01-27 PROCEDURE — 80307 DRUG TEST PRSMV CHEM ANLYZR: CPT

## 2023-01-27 PROCEDURE — 77030013992 HC SNR POLYP ENDOSC BSC -B: Performed by: INTERNAL MEDICINE

## 2023-01-27 PROCEDURE — 76040000019: Performed by: INTERNAL MEDICINE

## 2023-01-27 PROCEDURE — 76060000031 HC ANESTHESIA FIRST 0.5 HR: Performed by: INTERNAL MEDICINE

## 2023-01-27 PROCEDURE — 74011250636 HC RX REV CODE- 250/636: Performed by: ANESTHESIOLOGY

## 2023-01-27 PROCEDURE — 77030021593 HC FCPS BIOP ENDOSC BSC -A: Performed by: INTERNAL MEDICINE

## 2023-01-27 PROCEDURE — 74011250637 HC RX REV CODE- 250/637: Performed by: INTERNAL MEDICINE

## 2023-01-27 PROCEDURE — 74011250636 HC RX REV CODE- 250/636: Performed by: NURSE ANESTHETIST, CERTIFIED REGISTERED

## 2023-01-27 PROCEDURE — 77030008565 HC TBNG SUC IRR ERBE -B: Performed by: INTERNAL MEDICINE

## 2023-01-27 PROCEDURE — 74011250637 HC RX REV CODE- 250/637: Performed by: NURSE ANESTHETIST, CERTIFIED REGISTERED

## 2023-01-27 RX ORDER — LABETALOL HYDROCHLORIDE 5 MG/ML
5 INJECTION, SOLUTION INTRAVENOUS
Status: DISCONTINUED | OUTPATIENT
Start: 2023-01-27 | End: 2023-01-27

## 2023-01-27 RX ORDER — PROPOFOL 10 MG/ML
INJECTION, EMULSION INTRAVENOUS AS NEEDED
Status: DISCONTINUED | OUTPATIENT
Start: 2023-01-27 | End: 2023-01-27 | Stop reason: HOSPADM

## 2023-01-27 RX ORDER — LIDOCAINE HYDROCHLORIDE 10 MG/ML
0.1 INJECTION, SOLUTION EPIDURAL; INFILTRATION; INTRACAUDAL; PERINEURAL AS NEEDED
Status: DISCONTINUED | OUTPATIENT
Start: 2023-01-27 | End: 2023-01-27 | Stop reason: HOSPADM

## 2023-01-27 RX ORDER — DEXTROMETHORPHAN/PSEUDOEPHED 2.5-7.5/.8
DROPS ORAL AS NEEDED
Status: DISCONTINUED | OUTPATIENT
Start: 2023-01-27 | End: 2023-01-27 | Stop reason: HOSPADM

## 2023-01-27 RX ORDER — SODIUM CHLORIDE, SODIUM LACTATE, POTASSIUM CHLORIDE, CALCIUM CHLORIDE 600; 310; 30; 20 MG/100ML; MG/100ML; MG/100ML; MG/100ML
25 INJECTION, SOLUTION INTRAVENOUS CONTINUOUS
Status: DISCONTINUED | OUTPATIENT
Start: 2023-01-27 | End: 2023-01-27 | Stop reason: HOSPADM

## 2023-01-27 RX ORDER — FAMOTIDINE 20 MG/1
20 TABLET, FILM COATED ORAL ONCE
Status: COMPLETED | OUTPATIENT
Start: 2023-01-27 | End: 2023-01-27

## 2023-01-27 RX ORDER — SODIUM CHLORIDE, SODIUM LACTATE, POTASSIUM CHLORIDE, CALCIUM CHLORIDE 600; 310; 30; 20 MG/100ML; MG/100ML; MG/100ML; MG/100ML
INJECTION, SOLUTION INTRAVENOUS
Status: DISCONTINUED | OUTPATIENT
Start: 2023-01-27 | End: 2023-01-27 | Stop reason: HOSPADM

## 2023-01-27 RX ORDER — INSULIN LISPRO 100 [IU]/ML
INJECTION, SOLUTION INTRAVENOUS; SUBCUTANEOUS ONCE
Status: DISCONTINUED | OUTPATIENT
Start: 2023-01-27 | End: 2023-01-27 | Stop reason: HOSPADM

## 2023-01-27 RX ADMIN — SODIUM CHLORIDE, POTASSIUM CHLORIDE, SODIUM LACTATE AND CALCIUM CHLORIDE 25 ML/HR: 600; 310; 30; 20 INJECTION, SOLUTION INTRAVENOUS at 12:17

## 2023-01-27 RX ADMIN — SODIUM CHLORIDE, SODIUM LACTATE, POTASSIUM CHLORIDE, AND CALCIUM CHLORIDE: 600; 310; 30; 20 INJECTION, SOLUTION INTRAVENOUS at 13:10

## 2023-01-27 RX ADMIN — FAMOTIDINE 20 MG: 20 TABLET, FILM COATED ORAL at 11:59

## 2023-01-27 RX ADMIN — PROPOFOL 50 MG: 10 INJECTION, EMULSION INTRAVENOUS at 13:32

## 2023-01-27 RX ADMIN — PROPOFOL 50 MG: 10 INJECTION, EMULSION INTRAVENOUS at 13:26

## 2023-01-27 RX ADMIN — PROPOFOL 100 MG: 10 INJECTION, EMULSION INTRAVENOUS at 13:18

## 2023-01-27 RX ADMIN — PROPOFOL 25 MG: 10 INJECTION, EMULSION INTRAVENOUS at 13:22

## 2023-01-27 NOTE — H&P
[email or web address]  [phone]    GASTROENTEROLOGY Pre-Procedure H and P      Impression/Plan:   1. This patient is consented for a colonoscopy for positive FOBT. Chief Complaint: positive FOBT.      HPI:  Pietro Rangel is a 66 y.o. female who presents for a colonoscopy for positive FOBT.     PMH:   Past Medical History:   Diagnosis Date    Arthritis     Chronic diastolic CHF (congestive heart failure) (HCC)     Diabetes (HCC)     High cholesterol     Hypertension     Hypoxia     Oxygen at 2/liter at night    PVD (peripheral vascular disease) [address]     Renal artery stenosis (HCC)     Renal lesion     Stroke ([facility] 2000       PSH:   Past Surgical History:   Procedure Laterality Date    HX CHOLECYSTECTOMY      HX RENAL ARTERY STENT      IA UNLISTED PROCEDURE ABDOMEN PERITONEUM & OMENTUM         Social HX:   Social History     Socioeconomic History    Marital status:      Spouse name: Not on file    Number of children: Not on file    Years of education: Not on file    Highest education level: Not on file   Occupational History    Not on file   Tobacco Use    Smoking status: Former    Smokeless tobacco: Never   Vaping Use    Vaping Use: Never used   Substance and Sexual Activity    Alcohol use: Yes     Comment: occasional    Drug use: Yes     Types: Marijuana    Sexual activity: Not on file   Other Topics Concern    Not on file   Social History Narrative    Not on file     Social Determinants of Health     Financial Resource Strain: Not on file   Food Insecurity: Not on file   Transportation Needs: Not on file   Physical Activity: Not on file   Stress: Not on file   Social Connections: Not on file   Intimate Partner Violence: Not on file   Housing Stability: Not on file       FHX:   Family History   Problem Relation Age of Onset    Hypertension Mother     Hypertension Father     Hypertension Sister     Hypertension Brother        Allergy:   Allergies   Allergen Reactions    Mylanta [Aluminum-Magnesium Hydroxide] Hives       Home Medications:     Medications Prior to Admission   Medication Sig    cloNIDine HCL (CATAPRES) 0.1 mg tablet Take 0.1 mg by mouth daily. atorvastatin (LIPITOR) 20 mg tablet Take 20 mg by mouth nightly. carvediloL (COREG) 12.5 mg tablet Take 12.5 mg by mouth two (2) times daily (with meals). DULoxetine (CYMBALTA) 30 mg capsule Take 1 Cap by mouth daily. furosemide (LASIX) 20 mg tablet 20 mg. Olmesartan-amLODIPine-HCTZ 40-10-25 mg tab     aspirin 81 mg chewable tablet Take 81 mg by mouth daily. bimatoprost (LUMIGAN) 0.03 % ophthalmic drops Administer 1 Drop to both eyes every evening. clopidogreL (PLAVIX) 75 mg tab Take 75 mg by mouth daily. (Patient not taking: Reported on 1/27/2023)    cycloSPORINE (RESTASIS) 0.05 % dpet        Review of Systems:     A complete 10 point review of systems was performed and pertinents are as per the HPI. Remainder of the review of systems was negative. Visit Vitals  BP (!) 186/70   Pulse 89   Temp 98.3 °F (36.8 °C)   Resp 20   Ht 5' 1\" (1.549 m)   Wt 81.6 kg (180 lb)   SpO2 98%   Breastfeeding No   BMI 34.01 kg/m²       Physical Assessment:     General: alert, cooperative, no acute distress, appears stated age. HEENT: normocephalic, no scleral icterus, moist mucous membranes, EOMs intact, no neck masses noted. Respiratory: lungs clear to auscultation bilaterally. Cardiovascular: regular rate and rhythm, no murmurs, rubs or gallops. Abdomen: normal bowel sounds, soft, non-tender to palpation. Extremities: no lower extremity edema, no cyanosis or clubbing. Neuro: alert and oriented x 3; non-focal exam.  Skin: no rashes. Psych: normal mood and affect.            Gia Avila MD  [facility]  [facility]  Phone: [phone]

## 2023-01-27 NOTE — PROCEDURES
[email or web address]  [place], [address]      Brief Procedure Note    Pietro Rangel  1945  [id number]    Date of Procedure: 1/27/2023    Preoperative diagnosis: Angiodysplasia of stomach and duodenum without bleeding [K31.819]  Cerebrovascular accident (CVA), unspecified mechanism ([address] [I63.9]  Mild dementia, unspecified dementia type, unspecified whether behavioral, psychotic, or mood disturbance or anxiety [F03. A0]  Other specified diabetes mellitus with other specified complication, unspecified whether long term insulin use ([address] [E13.69]  Diarrhea, unspecified type [R19.7]  Gastroesophageal reflux disease, unspecified whether esophagitis present [K21.9]  Small bowel stricture ([address] [K56.699]  Renal artery atherosclerosis ([address] [I70.1]  Foreign body in small intestine, initial encounter [T18. 3XXA]  Obesity, unspecified classification, unspecified obesity type, unspecified whether serious comorbidity present [E66.9]    Postoperative diagnosis: normal colon    Type of Anesthesia: MAC (Monitored anesthesia care)    Description of findings: same as post op dx    Procedure: Procedure(s):  COLONOSCOPY    :  Dr. Sunny Christiansen MD    Assistant(s): Endoscopy Technician-1: Maritza Starks  Endoscopy RN-1: Leonidas Negrete RN    EBL:None    Specimens: * No specimens in log *    Findings: See printed and scanned procedure note    Complications: None    Dr. Sunny Christiansen MD  1/27/2023  1:46 PM

## 2023-01-27 NOTE — PERIOP NOTES
Dr Tammie Mitchell aware of BP. Pt no longer has IV access. Pt educated to take BP medication as soon as she gets home. Pt verbalized understanding. Pt denies chest pain, headache or blurred/change of vision.

## 2023-01-27 NOTE — DISCHARGE INSTRUCTIONS
Colonoscopy: What to Expect at [address] [place] [address]  After a colonoscopy, you'll stay at the clinic until you wake up. Then you can go home. But you'll need to arrange for a ride. Your doctor will tell you when you can eat and do your other usual activities. Your doctor will talk to you about when you'll need your next colonoscopy. Your doctor can help you decide how often you need to be checked. This will depend on the results of your test and your risk for colorectal cancer. After the test, you may be bloated or have gas pains. You may need to pass gas. If a biopsy was done or a polyp was removed, you may have streaks of blood in your stool (feces) for a few days. Problems such as heavy rectal bleeding may not occur until several weeks after the test. This isn't common. But it can happen after polyps are removed. This care sheet gives you a general idea about how long it will take for you to recover. But each person recovers at a different pace. Follow the steps below to get better as quickly as possible. How can you care for yourself at home? Activity    Rest when you feel tired. You can do your normal activities when it feels okay to do so. Diet    Follow your doctor's directions for eating. Unless your doctor has told you not to, drink plenty of fluids. This helps to replace the fluids that were lost during the colon prep. Do not drink alcohol. Medicines    Your doctor will tell you if and when you can restart your medicines. You will also be given instructions about taking any new medicines. If you take aspirin or some other blood thinner, ask your doctor if and when to start taking it again. Make sure that you understand exactly what your doctor wants you to do. If polyps were removed or a biopsy was done during the test, your doctor may tell you not to take aspirin or other anti-inflammatory medicines for a few days. These include ibuprofen (Advil, Motrin) and naproxen (Aleve). Other instructions    For your safety, do not drive or operate machinery until the medicine wears off and you can think clearly. Your doctor may tell you not to drive or operate machinery until the day after your test.     Do not sign legal documents or make major decisions until the medicine wears off and you can think clearly. The anesthesia can make it hard for you to fully understand what you are agreeing to. Follow-up care is a key part of your treatment and safety. Be sure to make and go to all appointments, and call your doctor if you are having problems. It's also a good idea to know your test results and keep a list of the medicines you take. When should you call for help? Call 911 anytime you think you may need emergency care. For example, call if:    You passed out (lost consciousness). You pass maroon or bloody stools. You have trouble breathing. Call your doctor now or seek immediate medical care if:    You have pain that does not get better after you take pain medicine. You are sick to your stomach or cannot drink fluids. You have new or worse belly pain. You have blood in your stools. You have a fever. You cannot pass stools or gas. Watch closely for changes in your health, and be sure to contact your doctor if you have any problems. Where can you learn more? Go to http://www.gray.com/  Enter E264 in the search box to learn more about \"Colonoscopy: What to Expect at Home. \"  Current as of: September 8, 2021               Content Version: 13.2  © 2006-2022 [employer]. Care instructions adapted under license by [employer] (which disclaims liability or warranty for this information). If you have questions about a medical condition or this instruction, always ask your healthcare professional. [place] [address] any warranty or liability for your use of this information.     DISCHARGE SUMMARY from Nurse     POST-PROCEDURE INSTRUCTIONS:    Call your Physician if you:  Observe any excess bleeding. Develop a temperature over 100.5o F. Experience abdominal, shoulder or chest pain. Notice any signs of decreased circulation or nerve impairment to an extremity such as a change in color, persistent numbness, tingling, coldness or increase in pain. Vomit blood or you have nausea and vomiting lasting longer than 4 hours. Are unable to take medications. Are unable to urinate within 8 hours after discharge following general anesthesia or intravenous sedation. For the next 24 hours after receiving general anesthesia or intravenous sedation, or while taking prescription Narcotics, limit your activities:  Do NOT drive a motor vehicle, operate hazard machinery or power tools, or perform tasks that require coordination. The medication you received during your procedure may have some effect on your mental awareness. Do NOT make important personal or business decisions. The medication you received during your procedure may have some effect on your mental awareness. Do NOT drink alcoholic beverages. These drinks do not mix well with the medications that have been given to you. Upon discharge from the hospital, you must be accompanied by a responsible adult. Resume your diet as directed by your physician. Resume medications as your physician has prescribed. Please give a list of your current medications to your Primary Care Provider. Please update this list whenever your medications are discontinued, doses are changed, or new medications (including over-the-counter products) are added. Please carry medication information at all times in case of emergency situations. These are general instructions for a healthy lifestyle:    No smoking/ No tobacco products/ Avoid exposure to second hand smoke.   Surgeon General's Warning:  Quitting smoking now greatly reduces serious risk to your health. Obesity, smoking, and a sedentary lifestyle greatly increase your risk for illness. A healthy diet, regular physical exercise & weight monitoring are important for maintaining a healthy lifestyle  You may be retaining fluid if you have a history of heart failure or if you experience any of the following symptoms:  Weight gain of 3 pounds or more overnight or 5 pounds in a week, increased swelling in our hands or feet or shortness of breath while lying flat in bed. Please call your doctor as soon as you notice any of these symptoms; do not wait until your next office visit. Recognize signs and symptoms of STROKE:  F  -  Face looks uneven  A  -  Arms unable to move or move unevenly  S  -  Speech slurred or non-existent  T  -  Time to call 911 - as soon as signs and symptoms begin - DO NOT go back to bed or wait to see If you get better - TIME IS BRAIN. Colorectal Screening  Colorectal cancer almost always develops from precancerous polyps (abnormal growths) in the colon or rectum. Screening tests can find precancerous polyps, so that they can be removed before they turn into cancer. Screening tests can also find colorectal cancer early, when treatment works best.  Speak with your physician about when you should begin screening and how often you should be tested. [employer] Activation    Thank you for requesting access to [employer]. Please follow the instructions below to securely access and download your online medical record. [employer] allows you to send messages to your doctor, view your test results, renew your prescriptions, schedule appointments, and more. How Do I Sign Up? In your internet browser, go to [email or web address]. Click on the First Time User? Click Here link in the Sign In box. You will see the New Member Sign Up page. Enter your [employer] Access Code exactly as it appears below.  You will not need to use this code after youve completed the sign-up process. If you do not sign up before the expiration date, you must request a new code. [employer] Access Code: Activation code not generated  Current N[employer] Status: Active (This is the date your [employer] access code will )    Enter the last four digits of your Social Security Number (xxxx) and Date of Birth (mm/dd/yyyy) as indicated and click Submit. You will be taken to the next sign-up page. Create a [employer] ID. This will be your [employer] login ID and cannot be changed, so think of one that is secure and easy to remember. Create a [employer] password. You can change your password at any time. Enter your Password Reset Question and Answer. This can be used at a later time if you forget your password. Enter your e-mail address. You will receive e-mail notification when new information is available in [address] Click Sign Up. You can now view and download portions of your medical record. Click the [employer] link to download a portable copy of your medical information. Additional Information    If you have questions, please call [phone]. Remember, [employer] is NOT to be used for urgent needs. For medical emergencies, dial 911. Educational references and/or instructions provided during this visit included:    See Attached      APPOINTMENTS:    Per MD Instruction    Discharge information has been reviewed with the patient. The patient verbalized understanding.

## 2023-01-27 NOTE — ANESTHESIA PREPROCEDURE EVALUATION
Relevant Problems   No relevant active problems       Anesthetic History   No history of anesthetic complications            Review of Systems / Medical History  Patient summary reviewed and pertinent labs reviewed    Pulmonary                   Neuro/Psych       CVA  TIA     Cardiovascular    Hypertension      CHF    PAD      Comments: Chronic diastolic CHF (congestive heart failure) (HCC)     Diabetes (HCC)     High cholesterol     Hypertension     Hypoxia  Oxygen at 2/liter at night   PVD (peripheral vascular disease) ([facility] [address]     Renal artery stenosis (HCC)     Renal lesion     Stroke ([facility] [address] 2000         GI/Hepatic/Renal                Endo/Other    Diabetes: type 2    Morbid obesity and arthritis     Other Findings              Physical Exam    Airway  Mallampati: II  TM Distance: 4 - 6 cm  Neck ROM: normal range of motion, short neck   Mouth opening: Normal     Cardiovascular    Rhythm: regular  Rate: normal         Dental    Dentition: Poor dentition     Pulmonary      Decreased breath sounds: bilateral           Abdominal  GI exam deferred       Other Findings            Anesthetic Plan    ASA: 3  Anesthesia type: MAC            Anesthetic plan and risks discussed with: Patient

## 2023-01-27 NOTE — ANESTHESIA POSTPROCEDURE EVALUATION
Procedure(s):  COLONOSCOPY.     MAC    Anesthesia Post Evaluation      Multimodal analgesia: multimodal analgesia used between 6 hours prior to anesthesia start to PACU discharge  Patient location during evaluation: bedside  Patient participation: complete - patient participated  Level of consciousness: awake  Pain management: adequate  Airway patency: patent  Anesthetic complications: no  Cardiovascular status: stable  Respiratory status: acceptable  Hydration status: acceptable  Post anesthesia nausea and vomiting:  controlled      INITIAL Post-op Vital signs:   Vitals Value Taken Time   /69 01/27/23 1346   Temp     Pulse 71 01/27/23 1346   Resp 10 01/27/23 1346   SpO2 99 % 01/27/23 1346

## (undated) DEVICE — SOL IRR STRL H2O 1000ML BTL --

## (undated) DEVICE — ENDOSCOPY PUMP TUBING/ CAP SET: Brand: ERBE

## (undated) DEVICE — SYR 50ML SLIP TIP NSAF LF STRL --

## (undated) DEVICE — MEDI-VAC NON-CONDUCTIVE SUCTION TUBING: Brand: CARDINAL HEALTH

## (undated) DEVICE — YANKAUER,SMOOTH HANDLE,HIGH CAPACITY: Brand: MEDLINE INDUSTRIES, INC.

## (undated) DEVICE — SYRINGE MED 25GA 3ML L5/8IN SUBQ PLAS W/ DETACH NDL SFTY

## (undated) DEVICE — GAUZE,SPONGE,4"X4",16PLY,STRL,LF,10/TRAY: Brand: MEDLINE

## (undated) DEVICE — CANNULA NSL AD TBNG L14FT STD PVC O2 CRV CONN NONFLARED NSL

## (undated) DEVICE — LINER SUCT CANSTR 3000CC PLAS SFT PRE ASSEMB W/OUT TBNG W/

## (undated) DEVICE — GOWN ISOL IMPERV UNIV, DISP, OPEN BACK, BLUE --

## (undated) DEVICE — SYR 20ML LL STRL LF --

## (undated) DEVICE — FCPS RAD JAW 4LC 240CM W/NDL -- BX/40

## (undated) DEVICE — SYR 10ML LUER LOK 1/5ML GRAD --

## (undated) DEVICE — SNARE POLYP MED OVL 27X2.4X240 -- CAPTIVATOR BX/10

## (undated) DEVICE — CATHETER SUCT TR FL TIP 14FR W/ O CTRL

## (undated) DEVICE — CANNULA ORIG TL CLR W FOAM CUSHIONS AND 14FT SUPL TB 3 CHN

## (undated) DEVICE — FLUFF AND POLYMER UNDERPAD,EXTRA HEAVY: Brand: WINGS